# Patient Record
Sex: FEMALE | Race: WHITE | Employment: FULL TIME | URBAN - METROPOLITAN AREA
[De-identification: names, ages, dates, MRNs, and addresses within clinical notes are randomized per-mention and may not be internally consistent; named-entity substitution may affect disease eponyms.]

---

## 2018-08-27 ENCOUNTER — HOSPITAL ENCOUNTER (OUTPATIENT)
Dept: ULTRASOUND IMAGING | Age: 53
Discharge: HOME | End: 2018-08-27
Attending: NURSE PRACTITIONER
Payer: COMMERCIAL

## 2018-08-27 DIAGNOSIS — M79.89 LEG SWELLING: ICD-10-CM

## 2018-08-27 PROCEDURE — 93970 EXTREMITY STUDY: CPT

## 2018-10-26 PROBLEM — F32.0 CURRENT MILD EPISODE OF MAJOR DEPRESSIVE DISORDER WITHOUT PRIOR EPISODE (HCC): Status: ACTIVE | Noted: 2018-10-26

## 2019-05-25 ENCOUNTER — HOSPITAL ENCOUNTER (OUTPATIENT)
Dept: MAMMOGRAPHY | Age: 54
Discharge: HOME | End: 2019-05-25
Attending: NURSE PRACTITIONER
Payer: COMMERCIAL

## 2019-05-25 DIAGNOSIS — Z12.39 BREAST CANCER SCREENING: ICD-10-CM

## 2019-05-25 PROCEDURE — 77067 SCR MAMMO BI INCL CAD: CPT

## 2020-04-03 ENCOUNTER — HOSPITAL ENCOUNTER (OUTPATIENT)
Age: 55
Setting detail: OUTPATIENT SURGERY
Discharge: HOME | End: 2020-04-04
Attending: EMERGENCY MEDICINE | Admitting: SURGERY
Payer: COMMERCIAL

## 2020-04-03 ENCOUNTER — ANESTHESIA (OUTPATIENT)
Dept: SURGERY | Age: 55
End: 2020-04-03
Payer: COMMERCIAL

## 2020-04-03 ENCOUNTER — ANESTHESIA EVENT (OUTPATIENT)
Dept: SURGERY | Age: 55
End: 2020-04-03
Payer: COMMERCIAL

## 2020-04-03 ENCOUNTER — APPOINTMENT (OUTPATIENT)
Dept: CT IMAGING | Age: 55
End: 2020-04-03
Attending: EMERGENCY MEDICINE
Payer: COMMERCIAL

## 2020-04-03 DIAGNOSIS — K35.30 ACUTE APPENDICITIS WITH LOCALIZED PERITONITIS, WITHOUT PERFORATION OR ABSCESS, UNSPECIFIED WHETHER GANGRENE PRESENT: Primary | ICD-10-CM

## 2020-04-03 PROBLEM — R10.31 RLQ ABDOMINAL PAIN: Status: ACTIVE | Noted: 2020-04-03

## 2020-04-03 PROBLEM — K35.80 ACUTE APPENDICITIS: Status: ACTIVE | Noted: 2020-04-03

## 2020-04-03 PROBLEM — D72.829 LEUKOCYTOSIS: Status: ACTIVE | Noted: 2020-04-03

## 2020-04-03 LAB
ALBUMIN SERPL-MCNC: 3.7 G/DL (ref 3.5–5)
ALBUMIN/GLOB SERPL: 0.9 {RATIO} (ref 1.2–3.5)
ALP SERPL-CCNC: 94 U/L (ref 50–130)
ALT SERPL-CCNC: 13 U/L (ref 12–65)
ANION GAP SERPL CALC-SCNC: 4 MMOL/L (ref 7–16)
AST SERPL-CCNC: 14 U/L (ref 15–37)
BASOPHILS # BLD: 0 K/UL (ref 0–0.2)
BASOPHILS NFR BLD: 0 % (ref 0–2)
BILIRUB SERPL-MCNC: 0.5 MG/DL (ref 0.2–1.1)
BUN SERPL-MCNC: 8 MG/DL (ref 6–23)
CALCIUM SERPL-MCNC: 9.1 MG/DL (ref 8.3–10.4)
CHLORIDE SERPL-SCNC: 101 MMOL/L (ref 98–107)
CO2 SERPL-SCNC: 31 MMOL/L (ref 21–32)
CREAT SERPL-MCNC: 0.92 MG/DL (ref 0.6–1)
DIFFERENTIAL METHOD BLD: ABNORMAL
EOSINOPHIL # BLD: 0 K/UL (ref 0–0.8)
EOSINOPHIL NFR BLD: 0 % (ref 0.5–7.8)
ERYTHROCYTE [DISTWIDTH] IN BLOOD BY AUTOMATED COUNT: 11.9 % (ref 11.9–14.6)
GLOBULIN SER CALC-MCNC: 4.3 G/DL (ref 2.3–3.5)
GLUCOSE SERPL-MCNC: 98 MG/DL (ref 65–100)
HCT VFR BLD AUTO: 43 % (ref 35.8–46.3)
HGB BLD-MCNC: 14.1 G/DL (ref 11.7–15.4)
IMM GRANULOCYTES # BLD AUTO: 0.1 K/UL (ref 0–0.5)
IMM GRANULOCYTES NFR BLD AUTO: 0 % (ref 0–5)
LIPASE SERPL-CCNC: 302 U/L (ref 73–393)
LYMPHOCYTES # BLD: 1.2 K/UL (ref 0.5–4.6)
LYMPHOCYTES NFR BLD: 10 % (ref 13–44)
MCH RBC QN AUTO: 29.6 PG (ref 26.1–32.9)
MCHC RBC AUTO-ENTMCNC: 32.8 G/DL (ref 31.4–35)
MCV RBC AUTO: 90.1 FL (ref 79.6–97.8)
MONOCYTES # BLD: 0.6 K/UL (ref 0.1–1.3)
MONOCYTES NFR BLD: 5 % (ref 4–12)
NEUTS SEG # BLD: 10.1 K/UL (ref 1.7–8.2)
NEUTS SEG NFR BLD: 84 % (ref 43–78)
NRBC # BLD: 0 K/UL (ref 0–0.2)
PLATELET # BLD AUTO: 275 K/UL (ref 150–450)
PMV BLD AUTO: 10.9 FL (ref 9.4–12.3)
POTASSIUM SERPL-SCNC: 3.8 MMOL/L (ref 3.5–5.1)
PROT SERPL-MCNC: 8 G/DL (ref 6.3–8.2)
RBC # BLD AUTO: 4.77 M/UL (ref 4.05–5.2)
SODIUM SERPL-SCNC: 136 MMOL/L (ref 136–145)
WBC # BLD AUTO: 12.1 K/UL (ref 4.3–11.1)

## 2020-04-03 PROCEDURE — 77030008606 HC TRCR ENDOSC KII AMR -B: Performed by: SURGERY

## 2020-04-03 PROCEDURE — 96376 TX/PRO/DX INJ SAME DRUG ADON: CPT

## 2020-04-03 PROCEDURE — 77030022703 HC LIGASURE  BLNT LAPSCP SEAL COVD -E: Performed by: SURGERY

## 2020-04-03 PROCEDURE — 96375 TX/PRO/DX INJ NEW DRUG ADDON: CPT

## 2020-04-03 PROCEDURE — 77030040922 HC BLNKT HYPOTHRM STRY -A: Performed by: ANESTHESIOLOGY

## 2020-04-03 PROCEDURE — 96374 THER/PROPH/DIAG INJ IV PUSH: CPT

## 2020-04-03 PROCEDURE — 99218 HC RM OBSERVATION: CPT

## 2020-04-03 PROCEDURE — 77030021158 HC TRCR BLN GELPRT AMR -B: Performed by: SURGERY

## 2020-04-03 PROCEDURE — 74011000258 HC RX REV CODE- 258: Performed by: SURGERY

## 2020-04-03 PROCEDURE — 74011000250 HC RX REV CODE- 250: Performed by: NURSE ANESTHETIST, CERTIFIED REGISTERED

## 2020-04-03 PROCEDURE — 74011000250 HC RX REV CODE- 250: Performed by: SURGERY

## 2020-04-03 PROCEDURE — 88304 TISSUE EXAM BY PATHOLOGIST: CPT

## 2020-04-03 PROCEDURE — 77030040361 HC SLV COMPR DVT MDII -B: Performed by: SURGERY

## 2020-04-03 PROCEDURE — 77030007955 HC PCH ENDOSC SPEC J&J -B: Performed by: SURGERY

## 2020-04-03 PROCEDURE — 77030008608 HC TRCR ENDOSC SMTH AMR -B: Performed by: SURGERY

## 2020-04-03 PROCEDURE — 77030008522 HC TBNG INSUF LAPRO STRY -B: Performed by: SURGERY

## 2020-04-03 PROCEDURE — 77030031139 HC SUT VCRL2 J&J -A: Performed by: SURGERY

## 2020-04-03 PROCEDURE — 77030020829: Performed by: SURGERY

## 2020-04-03 PROCEDURE — 77030008462 HC STPLR SKN PROX J&J -A: Performed by: SURGERY

## 2020-04-03 PROCEDURE — 76010000161 HC OR TIME 1 TO 1.5 HR INTENSV-TIER 1: Performed by: SURGERY

## 2020-04-03 PROCEDURE — 77030039425 HC BLD LARYNG TRULITE DISP TELE -A: Performed by: NURSE ANESTHETIST, CERTIFIED REGISTERED

## 2020-04-03 PROCEDURE — 77030009967 HC RELD STPLR ENDOSC J&J -C: Performed by: SURGERY

## 2020-04-03 PROCEDURE — 76210000006 HC OR PH I REC 0.5 TO 1 HR: Performed by: SURGERY

## 2020-04-03 PROCEDURE — 81003 URINALYSIS AUTO W/O SCOPE: CPT

## 2020-04-03 PROCEDURE — 77030037088 HC TUBE ENDOTRACH ORAL NSL COVD-A: Performed by: NURSE ANESTHETIST, CERTIFIED REGISTERED

## 2020-04-03 PROCEDURE — 74011636320 HC RX REV CODE- 636/320: Performed by: EMERGENCY MEDICINE

## 2020-04-03 PROCEDURE — 74177 CT ABD & PELVIS W/CONTRAST: CPT

## 2020-04-03 PROCEDURE — 77030040830 HC CATH URETH FOL MDII -A: Performed by: SURGERY

## 2020-04-03 PROCEDURE — 83690 ASSAY OF LIPASE: CPT

## 2020-04-03 PROCEDURE — 74011000258 HC RX REV CODE- 258: Performed by: EMERGENCY MEDICINE

## 2020-04-03 PROCEDURE — 77030011810 HC STPLR ENDOSC J&J -G: Performed by: SURGERY

## 2020-04-03 PROCEDURE — 77030019908 HC STETH ESOPH SIMS -A: Performed by: ANESTHESIOLOGY

## 2020-04-03 PROCEDURE — 80053 COMPREHEN METABOLIC PANEL: CPT

## 2020-04-03 PROCEDURE — 77030012770 HC TRCR OPT FX AMR -B: Performed by: SURGERY

## 2020-04-03 PROCEDURE — 77030008756 HC TU IRR SUC STRY -B: Performed by: SURGERY

## 2020-04-03 PROCEDURE — 99284 EMERGENCY DEPT VISIT MOD MDM: CPT

## 2020-04-03 PROCEDURE — 74011250636 HC RX REV CODE- 250/636: Performed by: EMERGENCY MEDICINE

## 2020-04-03 PROCEDURE — 74011250636 HC RX REV CODE- 250/636: Performed by: NURSE ANESTHETIST, CERTIFIED REGISTERED

## 2020-04-03 PROCEDURE — 85025 COMPLETE CBC W/AUTO DIFF WBC: CPT

## 2020-04-03 PROCEDURE — 74011250636 HC RX REV CODE- 250/636: Performed by: SURGERY

## 2020-04-03 PROCEDURE — 77030018836 HC SOL IRR NACL ICUM -A: Performed by: SURGERY

## 2020-04-03 PROCEDURE — 76060000033 HC ANESTHESIA 1 TO 1.5 HR: Performed by: SURGERY

## 2020-04-03 PROCEDURE — 77030034849: Performed by: SURGERY

## 2020-04-03 RX ORDER — GLYCOPYRROLATE 0.2 MG/ML
INJECTION INTRAMUSCULAR; INTRAVENOUS AS NEEDED
Status: DISCONTINUED | OUTPATIENT
Start: 2020-04-03 | End: 2020-04-04 | Stop reason: HOSPADM

## 2020-04-03 RX ORDER — ONDANSETRON 2 MG/ML
4 INJECTION INTRAMUSCULAR; INTRAVENOUS
Status: COMPLETED | OUTPATIENT
Start: 2020-04-03 | End: 2020-04-03

## 2020-04-03 RX ORDER — PROPOFOL 10 MG/ML
INJECTION, EMULSION INTRAVENOUS AS NEEDED
Status: DISCONTINUED | OUTPATIENT
Start: 2020-04-03 | End: 2020-04-04 | Stop reason: HOSPADM

## 2020-04-03 RX ORDER — DEXAMETHASONE SODIUM PHOSPHATE 4 MG/ML
INJECTION, SOLUTION INTRA-ARTICULAR; INTRALESIONAL; INTRAMUSCULAR; INTRAVENOUS; SOFT TISSUE AS NEEDED
Status: DISCONTINUED | OUTPATIENT
Start: 2020-04-03 | End: 2020-04-04 | Stop reason: HOSPADM

## 2020-04-03 RX ORDER — ROCURONIUM BROMIDE 10 MG/ML
INJECTION, SOLUTION INTRAVENOUS AS NEEDED
Status: DISCONTINUED | OUTPATIENT
Start: 2020-04-03 | End: 2020-04-04 | Stop reason: HOSPADM

## 2020-04-03 RX ORDER — LIDOCAINE HYDROCHLORIDE 20 MG/ML
INJECTION, SOLUTION EPIDURAL; INFILTRATION; INTRACAUDAL; PERINEURAL AS NEEDED
Status: DISCONTINUED | OUTPATIENT
Start: 2020-04-03 | End: 2020-04-04 | Stop reason: HOSPADM

## 2020-04-03 RX ORDER — SUCCINYLCHOLINE CHLORIDE 20 MG/ML
INJECTION INTRAMUSCULAR; INTRAVENOUS AS NEEDED
Status: DISCONTINUED | OUTPATIENT
Start: 2020-04-03 | End: 2020-04-04 | Stop reason: HOSPADM

## 2020-04-03 RX ORDER — BUPIVACAINE HYDROCHLORIDE 2.5 MG/ML
INJECTION, SOLUTION EPIDURAL; INFILTRATION; INTRACAUDAL AS NEEDED
Status: DISCONTINUED | OUTPATIENT
Start: 2020-04-03 | End: 2020-04-03 | Stop reason: HOSPADM

## 2020-04-03 RX ORDER — MORPHINE SULFATE 10 MG/ML
5 INJECTION, SOLUTION INTRAMUSCULAR; INTRAVENOUS
Status: COMPLETED | OUTPATIENT
Start: 2020-04-03 | End: 2020-04-03

## 2020-04-03 RX ORDER — SODIUM CHLORIDE 0.9 % (FLUSH) 0.9 %
10 SYRINGE (ML) INJECTION
Status: COMPLETED | OUTPATIENT
Start: 2020-04-03 | End: 2020-04-03

## 2020-04-03 RX ORDER — SODIUM CHLORIDE, SODIUM LACTATE, POTASSIUM CHLORIDE, CALCIUM CHLORIDE 600; 310; 30; 20 MG/100ML; MG/100ML; MG/100ML; MG/100ML
150 INJECTION, SOLUTION INTRAVENOUS CONTINUOUS
Status: DISCONTINUED | OUTPATIENT
Start: 2020-04-03 | End: 2020-04-04 | Stop reason: HOSPADM

## 2020-04-03 RX ORDER — FENTANYL CITRATE 50 UG/ML
INJECTION, SOLUTION INTRAMUSCULAR; INTRAVENOUS AS NEEDED
Status: DISCONTINUED | OUTPATIENT
Start: 2020-04-03 | End: 2020-04-04 | Stop reason: HOSPADM

## 2020-04-03 RX ORDER — NEOSTIGMINE METHYLSULFATE 1 MG/ML
INJECTION, SOLUTION INTRAVENOUS AS NEEDED
Status: DISCONTINUED | OUTPATIENT
Start: 2020-04-03 | End: 2020-04-04 | Stop reason: HOSPADM

## 2020-04-03 RX ORDER — SODIUM CHLORIDE, SODIUM LACTATE, POTASSIUM CHLORIDE, CALCIUM CHLORIDE 600; 310; 30; 20 MG/100ML; MG/100ML; MG/100ML; MG/100ML
INJECTION, SOLUTION INTRAVENOUS
Status: DISCONTINUED | OUTPATIENT
Start: 2020-04-03 | End: 2020-04-04 | Stop reason: HOSPADM

## 2020-04-03 RX ADMIN — ONDANSETRON 4 MG: 2 INJECTION INTRAMUSCULAR; INTRAVENOUS at 18:50

## 2020-04-03 RX ADMIN — FAMOTIDINE 20 MG: 10 INJECTION, SOLUTION INTRAVENOUS at 22:44

## 2020-04-03 RX ADMIN — IOPAMIDOL 100 ML: 755 INJECTION, SOLUTION INTRAVENOUS at 19:47

## 2020-04-03 RX ADMIN — ROCURONIUM BROMIDE 20 MG: 10 INJECTION, SOLUTION INTRAVENOUS at 22:59

## 2020-04-03 RX ADMIN — LIDOCAINE HYDROCHLORIDE 60 MG: 20 INJECTION, SOLUTION EPIDURAL; INFILTRATION; INTRACAUDAL; PERINEURAL at 22:50

## 2020-04-03 RX ADMIN — FENTANYL CITRATE 50 MCG: 50 INJECTION INTRAMUSCULAR; INTRAVENOUS at 23:21

## 2020-04-03 RX ADMIN — PHENYLEPHRINE HYDROCHLORIDE 100 MCG: 10 INJECTION INTRAVENOUS at 23:06

## 2020-04-03 RX ADMIN — SODIUM CHLORIDE, SODIUM LACTATE, POTASSIUM CHLORIDE, AND CALCIUM CHLORIDE 150 ML/HR: 600; 310; 30; 20 INJECTION, SOLUTION INTRAVENOUS at 18:57

## 2020-04-03 RX ADMIN — SODIUM CHLORIDE 4.5 G: 900 INJECTION, SOLUTION INTRAVENOUS at 22:44

## 2020-04-03 RX ADMIN — SUCCINYLCHOLINE CHLORIDE 140 MG: 20 INJECTION, SOLUTION INTRAMUSCULAR; INTRAVENOUS at 22:50

## 2020-04-03 RX ADMIN — GLYCOPYRROLATE 0.4 MG: 0.2 INJECTION, SOLUTION INTRAMUSCULAR; INTRAVENOUS at 23:48

## 2020-04-03 RX ADMIN — Medication 10 ML: at 19:47

## 2020-04-03 RX ADMIN — MORPHINE SULFATE 5 MG: 10 INJECTION INTRAVENOUS at 18:50

## 2020-04-03 RX ADMIN — SODIUM CHLORIDE 100 ML: 900 INJECTION, SOLUTION INTRAVENOUS at 19:47

## 2020-04-03 RX ADMIN — MORPHINE SULFATE 5 MG: 10 INJECTION INTRAVENOUS at 22:01

## 2020-04-03 RX ADMIN — Medication 3 MG: at 23:48

## 2020-04-03 RX ADMIN — SODIUM CHLORIDE, SODIUM LACTATE, POTASSIUM CHLORIDE, AND CALCIUM CHLORIDE: 600; 310; 30; 20 INJECTION, SOLUTION INTRAVENOUS at 22:44

## 2020-04-03 RX ADMIN — ROCURONIUM BROMIDE 10 MG: 10 INJECTION, SOLUTION INTRAVENOUS at 23:21

## 2020-04-03 RX ADMIN — ROCURONIUM BROMIDE 5 MG: 10 INJECTION, SOLUTION INTRAVENOUS at 22:50

## 2020-04-03 RX ADMIN — FENTANYL CITRATE 50 MCG: 50 INJECTION INTRAMUSCULAR; INTRAVENOUS at 22:50

## 2020-04-03 RX ADMIN — DEXAMETHASONE SODIUM PHOSPHATE 4 MG: 4 INJECTION, SOLUTION INTRAMUSCULAR; INTRAVENOUS at 23:10

## 2020-04-03 RX ADMIN — PROPOFOL 200 MG: 10 INJECTION, EMULSION INTRAVENOUS at 22:50

## 2020-04-03 RX ADMIN — ONDANSETRON 4 MG: 2 INJECTION INTRAMUSCULAR; INTRAVENOUS at 22:01

## 2020-04-03 NOTE — ED TRIAGE NOTES
Patient co right lower abdominal pain and nausea since yesterday.   Patient denies any vaginal discharge

## 2020-04-03 NOTE — ED PROVIDER NOTES
The history is provided by the patient. Abdominal Pain    This is a new problem. The current episode started yesterday. The problem occurs constantly. The problem has been gradually worsening. Associated with: nausea. The pain is located in the RLQ. The quality of the pain is aching and sharp. The pain is moderate. Associated symptoms include nausea. Pertinent negatives include no fever, no diarrhea, no hematochezia, no melena, no vomiting, no dysuria, no frequency, no hematuria and no back pain. The pain is worsened by certain positions. The pain is relieved by nothing. The patient's surgical history includes cholecystectomy and hysterectomy. Past Medical History:   Diagnosis Date    Anxiety, generalized     Menopausal disorder     Menopause        Past Surgical History:   Procedure Laterality Date    HX CHOLECYSTECTOMY      HX GYN      hysterectomy    HX HYSTERECTOMY      IMPLANT BREAST SILICONE/EQ           Family History:   Problem Relation Age of Onset    Breast Cancer Paternal Aunt     Cancer Maternal Grandmother     Breast Cancer Paternal Grandmother     Diabetes Neg Hx     Heart Disease Neg Hx        Social History     Socioeconomic History    Marital status:      Spouse name: Not on file    Number of children: Not on file    Years of education: Not on file    Highest education level: Not on file   Occupational History    Not on file   Social Needs    Financial resource strain: Not on file    Food insecurity     Worry: Not on file     Inability: Not on file    Transportation needs     Medical: Not on file     Non-medical: Not on file   Tobacco Use    Smoking status: Former Smoker     Packs/day: 1.00     Years: 15.00     Pack years: 15.00     Last attempt to quit: 2000     Years since quittin.2    Smokeless tobacco: Never Used    Tobacco comment: about a pack a day for 13-15 years   Substance and Sexual Activity    Alcohol use:  Yes     Alcohol/week: 0.0 standard drinks     Comment: 6 per year    Drug use: No    Sexual activity: Yes     Partners: Female     Comment: hysterectomy   Lifestyle    Physical activity     Days per week: Not on file     Minutes per session: Not on file    Stress: Not on file   Relationships    Social connections     Talks on phone: Not on file     Gets together: Not on file     Attends [employer] service: Not on file     Active member of club or organization: Not on file     Attends meetings of clubs or organizations: Not on file     Relationship status: Not on file    Intimate partner violence     Fear of current or ex partner: Not on file     Emotionally abused: Not on file     Physically abused: Not on file     Forced sexual activity: Not on file   Other Topics Concern    Not on file   Social History Narrative    Not on file         ALLERGIES: Patient has no known allergies. Review of Systems   Constitutional: Negative for fever. HENT: Negative for congestion and rhinorrhea. Respiratory: Negative for cough and shortness of breath. Gastrointestinal: Positive for abdominal pain and nausea. Negative for diarrhea, hematochezia, melena and vomiting. Endocrine: Negative for polyuria. Genitourinary: Negative for dysuria, frequency and hematuria. Musculoskeletal: Negative for back pain. Vitals:    04/03/20 1814   BP: 125/88   Pulse: 73   Resp: 16   Temp: 98.3 °F (36.8 °C)   SpO2: 96%   Weight: 77.1 kg (170 lb)   Height: 5' 6\" (1.676 m)            Physical Exam  Vitals signs and nursing note reviewed. Constitutional:       General: She is not in acute distress. Appearance: She is well-developed. HENT:      Head: Normocephalic. Mouth/Throat:      Mouth: Mucous membranes are moist.   Eyes:      Pupils: Pupils are equal, round, and reactive to light. Cardiovascular:      Rate and Rhythm: Normal rate and regular rhythm. Heart sounds: Normal heart sounds.    Pulmonary:      Effort: Pulmonary effort is normal. Breath sounds: Normal breath sounds. Abdominal:      General: There is no distension. Palpations: Abdomen is soft. There is no mass. Tenderness: There is abdominal tenderness in the right lower quadrant, suprapubic area and left lower quadrant. There is no guarding or rebound. Positive signs include Rovsing's sign and McBurney's sign. Musculoskeletal: Normal range of motion. Lymphadenopathy:      Cervical: No cervical adenopathy. Skin:     General: Skin is warm and dry. Neurological:      Mental Status: She is alert. MDM  Number of Diagnoses or Management Options  Diagnosis management comments: Gradual worsening of right lower quadrant pain since yesterday. Patient is status post hysterectomy and cholecystectomy. Probable appendicitis. Rebound tenderness present. Positive Rovsing sign. No fevers. 8:10 PM  Surgery paged for definitive management of acute appendicitis.        Amount and/or Complexity of Data Reviewed  Clinical lab tests: ordered and reviewed (Results for orders placed or performed during the hospital encounter of 04/03/20  -CBC WITH AUTOMATED DIFF       Result                      Value             Ref Range           WBC                         12.1 (H)          4.3 - 11.1 K*       RBC                         4.77              4.05 - 5.2 M*       HGB                         14.1              11.7 - 15.4 *       HCT                         43.0              35.8 - 46.3 %       MCV                         90.1              79.6 - 97.8 *       MCH                         29.6              26.1 - 32.9 *       MCHC                        32.8              31.4 - 35.0 *       RDW                         11.9              11.9 - 14.6 %       PLATELET                    275               150 - 450 K/*       MPV                         10.9              9.4 - 12.3 FL       ABSOLUTE NRBC               0.00              0.0 - 0.2 K/*       DF AUTOMATED                             NEUTROPHILS                 84 (H)            43 - 78 %           LYMPHOCYTES                 10 (L)            13 - 44 %           MONOCYTES                   5                 4.0 - 12.0 %        EOSINOPHILS                 0 (L)             0.5 - 7.8 %         BASOPHILS                   0                 0.0 - 2.0 %         IMMATURE GRANULOCYTES       0                 0.0 - 5.0 %         ABS. NEUTROPHILS            10.1 (H)          1.7 - 8.2 K/*       ABS. LYMPHOCYTES            1.2               0.5 - 4.6 K/*       ABS. MONOCYTES              0.6               0.1 - 1.3 K/*       ABS. EOSINOPHILS            0.0               0.0 - 0.8 K/*       ABS. BASOPHILS              0.0               0.0 - 0.2 K/*       ABS. IMM. GRANS.            0.1               0.0 - 0.5 K/*  -METABOLIC PANEL, COMPREHENSIVE       Result                      Value             Ref Range           Sodium                      136               136 - 145 mm*       Potassium                   3.8               3.5 - 5.1 mm*       Chloride                    101               98 - 107 mmo*       CO2                         31                21 - 32 mmol*       Anion gap                   4 (L)             7 - 16 mmol/L       Glucose                     98                65 - 100 mg/*       BUN                         8                 6 - 23 MG/DL        Creatinine                  0.92              0.6 - 1.0 MG*       GFR est AA                  >60               >60 ml/min/1*       GFR est non-AA              >60               >60 ml/min/1*       Calcium                     9.1               8.3 - 10.4 M*       Bilirubin, total            0.5               0.2 - 1.1 MG*       ALT (SGPT)                  13                12 - 65 U/L         AST (SGOT)                  14 (L)            15 - 37 U/L         Alk.  phosphatase            94                50 - 130 U/L        Protein, total 8.0               6.3 - 8.2 g/*       Albumin                     3.7               3.5 - 5.0 g/*       Globulin                    4.3 (H)           2.3 - 3.5 g/*       A-G Ratio                   0.9 (L)           1.2 - 3.5      -LIPASE       Result                      Value             Ref Range           Lipase                      302               73 - 393 U/L   )  Tests in the radiology section of CPT®: ordered and reviewed (Ct Abd Pelv W Cont    Result Date: 4/3/2020  CT OF THE ABDOMEN AND PELVIS WITH CONTRAST. CLINICAL INDICATION: Right lower quadrant abdominal pain, concern for appendicitis PROCEDURE: Serial thin section axial images obtained from the lung bases through the proximal femurs following the administration of 100 cc of Isovue 370 intravenous contrast.  Radiation dose reduction techniques were used for this study. Our CT scanners use one or all of the following: Automated exposure control, adjusted of the mA and/or kV according to patient size, iterative reconstruction COMPARISON: No prior FINDINGS: CT ABDOMEN: Cholecystectomy clips are present. The liver and spleen are unremarkable. The adrenal glands are normal. The pancreas is unremarkable. The kidneys are symmetric in size and contrast enhanced. There is no hydronephrosis. The bowel is normal in course, caliber, and wall thickness. CT PELVIS: A fluid-filled, dilated appendix is appreciated. This lies in the right hemipelvis it measures 9 mm transverse with surrounding periappendiceal fat inflammation. A trace amount of reactive free fluid is noted dependently in the pelvis. No para appendiceal abscess evident. There is no inguinal hernia or adenopathy. The bladder is unremarkable. No aggressive osseous lesions identified. IMPRESSION: 1. Acute appendicitis with significant periappendiceal fat inflammation. No obvious periappendiceal abscess.  [facility] The critical results contained in this report were communicated to Dr. Sharon Butler by Dr. Katie Kaur on 4/3/2020 at 8:00 PM. Critical results were communicated as outlined in Section II.C.2.a.i of the ACR Practice Guideline for Communication of Diagnostic Imaging Findings.     )           Procedures

## 2020-04-04 VITALS
SYSTOLIC BLOOD PRESSURE: 116 MMHG | RESPIRATION RATE: 20 BRPM | DIASTOLIC BLOOD PRESSURE: 72 MMHG | WEIGHT: 170 LBS | TEMPERATURE: 98.5 F | HEIGHT: 66 IN | BODY MASS INDEX: 27.32 KG/M2 | HEART RATE: 69 BPM | OXYGEN SATURATION: 95 %

## 2020-04-04 PROCEDURE — 74011250637 HC RX REV CODE- 250/637: Performed by: ANESTHESIOLOGY

## 2020-04-04 PROCEDURE — 74011250636 HC RX REV CODE- 250/636: Performed by: ANESTHESIOLOGY

## 2020-04-04 PROCEDURE — 99218 HC RM OBSERVATION: CPT

## 2020-04-04 RX ORDER — SODIUM CHLORIDE 0.9 % (FLUSH) 0.9 %
5-40 SYRINGE (ML) INJECTION AS NEEDED
Status: DISCONTINUED | OUTPATIENT
Start: 2020-04-04 | End: 2020-04-04 | Stop reason: HOSPADM

## 2020-04-04 RX ORDER — OXYCODONE HYDROCHLORIDE 5 MG/1
5 TABLET ORAL
Status: DISCONTINUED | OUTPATIENT
Start: 2020-04-04 | End: 2020-04-04 | Stop reason: HOSPADM

## 2020-04-04 RX ORDER — HYDROMORPHONE HYDROCHLORIDE 2 MG/ML
0.5 INJECTION, SOLUTION INTRAMUSCULAR; INTRAVENOUS; SUBCUTANEOUS
Status: DISCONTINUED | OUTPATIENT
Start: 2020-04-04 | End: 2020-04-04 | Stop reason: HOSPADM

## 2020-04-04 RX ORDER — NALOXONE HYDROCHLORIDE 0.4 MG/ML
0.2 INJECTION, SOLUTION INTRAMUSCULAR; INTRAVENOUS; SUBCUTANEOUS AS NEEDED
Status: DISCONTINUED | OUTPATIENT
Start: 2020-04-04 | End: 2020-04-04 | Stop reason: HOSPADM

## 2020-04-04 RX ORDER — SODIUM CHLORIDE 0.9 % (FLUSH) 0.9 %
5-40 SYRINGE (ML) INJECTION EVERY 8 HOURS
Status: DISCONTINUED | OUTPATIENT
Start: 2020-04-04 | End: 2020-04-04 | Stop reason: HOSPADM

## 2020-04-04 RX ORDER — ACETAMINOPHEN 500 MG
1000 TABLET ORAL ONCE
Status: DISCONTINUED | OUTPATIENT
Start: 2020-04-04 | End: 2020-04-04 | Stop reason: HOSPADM

## 2020-04-04 RX ORDER — FLUMAZENIL 0.1 MG/ML
0.2 INJECTION INTRAVENOUS
Status: DISCONTINUED | OUTPATIENT
Start: 2020-04-04 | End: 2020-04-04 | Stop reason: HOSPADM

## 2020-04-04 RX ORDER — DIPHENHYDRAMINE HYDROCHLORIDE 50 MG/ML
12.5 INJECTION, SOLUTION INTRAMUSCULAR; INTRAVENOUS
Status: DISCONTINUED | OUTPATIENT
Start: 2020-04-04 | End: 2020-04-04 | Stop reason: HOSPADM

## 2020-04-04 RX ORDER — SODIUM CHLORIDE, SODIUM LACTATE, POTASSIUM CHLORIDE, CALCIUM CHLORIDE 600; 310; 30; 20 MG/100ML; MG/100ML; MG/100ML; MG/100ML
100 INJECTION, SOLUTION INTRAVENOUS CONTINUOUS
Status: DISCONTINUED | OUTPATIENT
Start: 2020-04-04 | End: 2020-04-04 | Stop reason: HOSPADM

## 2020-04-04 RX ORDER — OXYCODONE HYDROCHLORIDE 5 MG/1
10 TABLET ORAL
Status: COMPLETED | OUTPATIENT
Start: 2020-04-04 | End: 2020-04-04

## 2020-04-04 RX ADMIN — OXYCODONE HYDROCHLORIDE 10 MG: 5 TABLET ORAL at 00:28

## 2020-04-04 RX ADMIN — HYDROMORPHONE HYDROCHLORIDE 0.5 MG: 2 INJECTION INTRAMUSCULAR; INTRAVENOUS; SUBCUTANEOUS at 00:07

## 2020-04-04 RX ADMIN — HYDROMORPHONE HYDROCHLORIDE 0.5 MG: 2 INJECTION INTRAMUSCULAR; INTRAVENOUS; SUBCUTANEOUS at 00:12

## 2020-04-04 NOTE — ANESTHESIA PREPROCEDURE EVALUATION
Relevant Problems   No relevant active problems       Anesthetic History   No history of anesthetic complications            Review of Systems / Medical History  Patient summary reviewed and pertinent labs reviewed    Pulmonary  Within defined limits                 Neuro/Psych         Psychiatric history     Cardiovascular                  Exercise tolerance: >4 METS     GI/Hepatic/Renal  Within defined limits              Endo/Other             Other Findings              Physical Exam    Airway  Mallampati: II  TM Distance: 4 - 6 cm  Neck ROM: normal range of motion   Mouth opening: Normal     Cardiovascular    Rhythm: regular  Rate: normal         Dental         Pulmonary  Breath sounds clear to auscultation               Abdominal         Other Findings            Anesthetic Plan    ASA: 2, emergent  Anesthesia type: general          Induction: Intravenous and RSI  Anesthetic plan and risks discussed with: Patient

## 2020-04-04 NOTE — OP NOTES
[address], [address] [facility]  [phone]    OPERATVE REPORT    Name: Orene Alpers     Date of Surgery: 4/3/2020  Med Record Number: [id number]   Age: 50 y.o. Sex: female   Pre-operative Diagnosis: ACUTE APPENDICITIS  Post-operative Diagnosis: same  Procedure: Laparoscopic Appendectomy (CPT 39072)  Surgeon: Darin Eisenmenger, MD  Assistants: none  Anesthesia:  General  Complications: none  Specimens:  appendix to path  Estimated Blood Loss:      OPERATIVE PROCEDURE: The risks, benefits, potential complications,  treatment options and expected outcomes were discussed with the patient  and/or patient family members preoperatively. The possibilities of  reactions to medications, chronic pain, bleeding, infection, abscess and  injury to internal organs including bowel, fistula, blood clots, hernia,  the need for further surgeries or procedures, open surgery, etc...were discussed and all the patient's and/or patient's family members  questions were answered. Understanding was voiced and informed consent  was obtained preoperatively. The patient was taken to the operating room  and [address] [place] [address] time-out protocol check list was followed. After induction of general anesthesia, the abdomen was prepped and draped  in the usual fashion. The patient was opened through a subumbilical cut-down incision  and a Mercedes trocar was inserted under direct vision. After adequate  pneumoperitoneum, 5 mm and 12 mm trocars were placed in the usual  locations to help triangulate over the appendix. The appendix acutely inflamed but not ruptures. It was densely adherent and eroding into the terminal ileum and creating damage to the ileal wall which was inspected frequently during the case and looked reversible injured. The appendix was mobilized. The mesoappendix was divided with a LigaSure device. This was carried  down to the base of the appendix.  The appendix was divided from its insertion in the cecum with a laparoscopic CHRISTINE blue stapler and the appendix was then  placed within an Endo Catch bag and retracted out through the umbilical trocar  site with the camera having been reinserted through the 12 mm trocar  site, which had been placed to the right of midline. After a large amount of  irrigation of the intraabdominal cavity and confirmation of adequate  hemostasis and inspection of the cecal stump to confirm that the clips  were intact with good closure, Marcaine was infiltrated into each  trocar site. The trocars were withdrawn without active bleeding. The  fascia at the umbilical level was closed with interrupted 0 Vicryl sutures. The  fascia at the 12 mm trocar site was closed with Vicryl suture. The skin  was closed with clips. Sterile dressings were placed. The patient was  taken to recovery in good condition.     Susan Miller MD, FACS

## 2020-04-04 NOTE — DISCHARGE INSTRUCTIONS
OK to leave your incisional dressings alone until follow-up visit. If a dressing gets excessively saturated or falls off, it is OK to change it daily with gauze and tape (or band-aids) until follow-up visit. Keep them covered daily until follow-up. Try to keep incisions as dry as possible to lower risk of infection. No heavy lifting (>5lbs) for 6 weeks to reduce risk of developing a hernia in the incisions. No driving until you are off pain meds for 24hrs and have no pain with movements associated with driving. Pain prescription (Norco) on chart for patient to use as needed for pain  Follow-up with Dr Jenaro Bojorquez nurse practitioner Heidi Sanchez NP or Martine Leiva NP) in approx 10 days on a Monday. Then see Dr Irina Seymour as needed after that visit.   [address], [address]  (Call for an appt time unless one is already made for you by discharge nurse which is preferred -->[phone]-->option 1)

## 2020-04-04 NOTE — H&P
H&P/Consult Note/Progress Note/Office Note:   Joaquin Cadena  MRN: [id number]  :1965  Age:55 y.o.    HPI: Joaquin Cadena is a 50 y.o. female who came to the ER on 4/3/20 with progressive, constant, severe, non-radiating RLQ for 4 days. Nothing made pain better or worse. No associated fever. She is s/p lap kayleen, , and lap hysterectomy (for benign disease) in the  at an unknown hospital by unknown surgeons  In ER she had leukocytosis and CT as below with findings suggestive of acute appendicitis. Gen Surgery was consulted emergently. 4/3/20 CT abd/pelvis with IV contrast     CT ABDOMEN: Cholecystectomy clips are present. The liver and spleen are  unremarkable. The adrenal glands are normal. The pancreas is unremarkable. The  kidneys are symmetric in size and contrast enhanced. There is no hydronephrosis. The bowel is normal in course, caliber, and wall thickness.     CT PELVIS: A fluid-filled, dilated appendix is appreciated. This lies in the  right hemipelvis it measures 9 mm transverse with surrounding periappendiceal  fat inflammation. A trace amount of reactive free fluid is noted dependently in  the pelvis. No para appendiceal abscess evident. There is no inguinal hernia or  adenopathy. The bladder is unremarkable.     No aggressive osseous lesions identified.     IMPRESSION:  1. Acute appendicitis with significant periappendiceal fat inflammation.    No obvious periappendiceal abscess.           Past Medical History:   Diagnosis Date    Anxiety, generalized     Menopausal disorder     Menopause      Past Surgical History:   Procedure Laterality Date    HX CHOLECYSTECTOMY      HX GYN      hysterectomy    HX HYSTERECTOMY      IMPLANT BREAST SILICONE/EQ       Current Facility-Administered Medications   Medication Dose Route Frequency    lactated Ringers infusion  150 mL/hr IntraVENous CONTINUOUS    piperacillin-tazobactam (ZOSYN) 4.5 g in 0.9% sodium chloride (MBP/ADV) 100 mL  4.5 g IntraVENous Q6H    famotidine (PF) (PEPCID) 20 mg in 0.9% sodium chloride 10 mL injection  20 mg IntraVENous Q12H     Current Outpatient Medications   Medication Sig    estradioL (Vivelle-Dot) 0.025 mg/24 hr ptsw APPLY 1 PATCH EXTERNALLY TO THE SKIN 2 TIMES A WEEK    venlafaxine-SR (EFFEXOR-XR) 75 mg capsule Take 1 Cap by mouth daily.  ibuprofen (ADVIL) 200 mg tablet Take 800 mg by mouth two (2) times a day. Patient has no known allergies. Social History     Socioeconomic History    Marital status:      Spouse name: Not on file    Number of children: Not on file    Years of education: Not on file    Highest education level: Not on file   Tobacco Use    Smoking status: Former Smoker     Packs/day: 1.00     Years: 15.00     Pack years: 15.00     Last attempt to quit: 2000     Years since quittin.2    Smokeless tobacco: Never Used    Tobacco comment: about a pack a day for 13-15 years   Substance and Sexual Activity    Alcohol use: Yes     Alcohol/week: 0.0 standard drinks     Comment: 6 per year    Drug use: No    Sexual activity: Yes     Partners: Female     Comment: hysterectomy     Social History     Tobacco Use   Smoking Status Former Smoker    Packs/day: 1.00    Years: 15.00    Pack years: 15.00    Last attempt to quit: 2000    Years since quittin.2   Smokeless Tobacco Never Used   Tobacco Comment    about a pack a day for 13-15 years     Family History   Problem Relation Age of Onset    Breast Cancer Paternal Aunt     Cancer Maternal Grandmother     Breast Cancer Paternal Grandmother     Diabetes Neg Hx     Heart Disease Neg Hx      ROS: The patient has no difficulty with chest pain or shortness of breath. No fever or chills. Comprehensive review of systems was otherwise unremarkable except as noted above.     Physical Exam:   Visit Vitals  /76   Pulse 73   Temp 98.3 °F (36.8 °C)   Resp 16   Ht 5' 6\" (1.676 m)   Wt 170 lb (77.1 kg) SpO2 97%   BMI 27.44 kg/m²     Vitals:    04/03/20 2056 04/03/20 2128 04/03/20 2152 04/03/20 2200   BP:  131/66 125/79 121/76   Pulse:       Resp:       Temp:       SpO2: 97% 99% 97% 97%   Weight:       Height:         No intake/output data recorded. No intake/output data recorded. Constitutional: Alert, oriented, cooperative patient in no acute distress; appears stated age    Eyes:Sclera are clear. EOMs intact  ENMT: no external lesions gross hearing normal; no obvious neck masses, no ear or lip lesions, nares normal  CV: RRR. Normal perfusion  Resp: No JVD. Breathing is  non-labored; no audible wheezing. GI: soft and non-distended     Healed laparoscopic incisions  Healed low Tx incision  Guarding RLQ       Musculoskeletal: unremarkable with normal function. No embolic signs or cyanosis.    Neuro:  Oriented; moves all 4; no focal deficits  Psychiatric: normal affect and mood, no memory impairment    Recent vitals (if inpt):  Patient Vitals for the past 24 hrs:   BP Temp Pulse Resp SpO2 Height Weight   04/03/20 2200 121/76    97 %     04/03/20 2152 125/79    97 %     04/03/20 2128 131/66    99 %     04/03/20 2056     97 %     04/03/20 1853     98 %     04/03/20 1852 145/82         04/03/20 1814 125/88 98.3 °F (36.8 °C) 73 16 96 % 5' 6\" (1.676 m) 170 lb (77.1 kg)       Labs:  Recent Labs     04/03/20  1851   WBC 12.1*   HGB 14.1         K 3.8      CO2 31   BUN 8   CREA 0.92   GLU 98   TBILI 0.5   SGOT 14*   ALT 13   AP 94   LPSE 302       Lab Results   Component Value Date/Time    WBC 12.1 (H) 04/03/2020 06:51 PM    HGB 14.1 04/03/2020 06:51 PM    PLATELET 601 08/38/5724 06:51 PM    Sodium 136 04/03/2020 06:51 PM    Potassium 3.8 04/03/2020 06:51 PM    Chloride 101 04/03/2020 06:51 PM    CO2 31 04/03/2020 06:51 PM    BUN 8 04/03/2020 06:51 PM    Creatinine 0.92 04/03/2020 06:51 PM    Glucose 98 04/03/2020 06:51 PM    Bilirubin, total 0.5 04/03/2020 06:51 PM    AST (SGOT) 14 (L) 04/03/2020 06:51 PM    ALT (SGPT) 13 04/03/2020 06:51 PM    Alk. phosphatase 94 04/03/2020 06:51 PM    Lipase 302 04/03/2020 06:51 PM       CT Results  (Last 48 hours)               04/03/20 1955  CT ABD PELV W CONT Final result    Impression:  IMPRESSION:   1. Acute appendicitis with significant periappendiceal fat inflammation. No   obvious periappendiceal abscess. [facility]   The critical results contained in this report were communicated to Dr. Angel Damon   by Dr. Blaire Whitten on 4/3/2020 at 8:00 PM. Critical results were communicated as   outlined in Section II.C.2.a.i of the ACR Practice Guideline for Communication   of Diagnostic Imaging Findings. Narrative:  CT OF THE ABDOMEN AND PELVIS WITH CONTRAST. CLINICAL INDICATION: Right lower quadrant abdominal pain, concern for   appendicitis       PROCEDURE: Serial thin section axial images obtained from the lung bases through   the proximal femurs following the administration of 100 cc of Isovue 370   intravenous contrast.  Radiation dose reduction techniques were used for this   study. Our CT scanners use one or all of the following: Automated exposure   control, adjusted of the mA and/or kV according to patient size, iterative   reconstruction       COMPARISON: No prior       FINDINGS:        CT ABDOMEN: Cholecystectomy clips are present. The liver and spleen are   unremarkable. The adrenal glands are normal. The pancreas is unremarkable. The   kidneys are symmetric in size and contrast enhanced. There is no hydronephrosis. The bowel is normal in course, caliber, and wall thickness. CT PELVIS: A fluid-filled, dilated appendix is appreciated. This lies in the   right hemipelvis it measures 9 mm transverse with surrounding periappendiceal   fat inflammation. A trace amount of reactive free fluid is noted dependently in   the pelvis. No para appendiceal abscess evident. There is no inguinal hernia or   adenopathy.  The bladder is unremarkable. No aggressive osseous lesions identified. chest X-ray      I reviewed recent labs, recent radiologic studies, and pertinent records including other doctor notes if needed. I independently reviewed radiology images for studies I described above or studies I have ordered. Admission date (for inpatients): 4/3/2020   * No surgery date entered *  Procedure(s):  LAPAROSCOPIC APPENDECTOMY    ASSESSMENT/PLAN:  Problem List  Date Reviewed: 4/3/2020          Codes Class Noted    * (Principal) RLQ abdominal pain ICD-10-CM: R10.31  ICD-9-CM: 789.03  4/3/2020        Acute appendicitis ICD-10-CM: K35.80  ICD-9-CM: 540.9  4/3/2020        Leukocytosis ICD-10-CM: B90.021  ICD-9-CM: 288.60  4/3/2020        Current mild episode of major depressive disorder without [facility] episode ([facility] ICD-10-CM: F32.0  ICD-9-CM: 296.21  10/26/2018        Anxiety ICD-10-CM: F41.9  ICD-9-CM: 300.00  7/1/2015        Postmenopausal disorder ICD-10-CM: N95.1  ICD-9-CM: 627.9  7/1/2015        Abnormal weight gain ICD-10-CM: R63.5  ICD-9-CM: 783.1  7/1/2015        Cough ICD-10-CM: R05  ICD-9-CM: 786.2  7/1/2015            Principal Problem:    RLQ abdominal pain (4/3/2020)    Active Problems:    Acute appendicitis (4/3/2020)      Leukocytosis (4/3/2020)          RLQ  Admit for observation  NPO    Acute appendicitis suggested by CT with leukocytosis  IV Zosyn  SCDs  IV pepcid    I discussed the patient's condition with the patient at length and treatment options. I discussed risks of surgery in language the patient could understand including bleeding, infection, need for further surgery, abscess, fistula, SBO, blood clots, need for open surgery, renal failure, pneumonia, etc... Rosy Sharper Rosy Sharper Rosy Deng The patient voiced understanding of all this and all questions were answered. Alternatives to surgery were discussed also and risks of the alternatives. The patient requested that we proceed with surgery.     Informed consent was obtained. We decided to proceed with surgery. OR notified emergently          I have personally performed a face-to-face diagnostic evaluation and management  service on this patient. I have independently seen the patient. I have independently obtained the above history from the patient/family. I have independently examined the patient with above findings. I have independently reviewed data/labs for this patient and developed the above plan of care (MDM). Signed: Antonio Ahmadi.  Alba Clarke MD, FACS

## 2020-04-06 NOTE — ANESTHESIA POSTPROCEDURE EVALUATION
Procedure(s):  LAPAROSCOPIC APPENDECTOMY.     general    Anesthesia Post Evaluation      Multimodal analgesia: multimodal analgesia used between 6 hours prior to anesthesia start to PACU discharge  Patient location during evaluation: PACU  Patient participation: complete - patient participated  Level of consciousness: awake and alert  Pain management: adequate  Airway patency: patent  Anesthetic complications: no  Cardiovascular status: acceptable and hemodynamically stable  Respiratory status: acceptable  Hydration status: acceptable        Vitals Value Taken Time   /72 4/4/2020 12:45 AM   Temp 36.9 °C (98.5 °F) 4/3/2020 11:59 PM   Pulse 69 4/4/2020 12:45 AM   Resp 20 4/4/2020 12:45 AM   SpO2 95 % 4/4/2020 12:45 AM

## 2021-04-16 ENCOUNTER — TRANSCRIBE ORDER (OUTPATIENT)
Dept: SCHEDULING | Age: 56
End: 2021-04-16

## 2021-04-16 DIAGNOSIS — Z12.31 VISIT FOR SCREENING MAMMOGRAM: Primary | ICD-10-CM

## 2022-03-19 PROBLEM — D72.829 LEUKOCYTOSIS: Status: ACTIVE | Noted: 2020-04-03

## 2022-03-19 PROBLEM — F32.0 CURRENT MILD EPISODE OF MAJOR DEPRESSIVE DISORDER WITHOUT PRIOR EPISODE (HCC): Status: ACTIVE | Noted: 2018-10-26

## 2022-03-19 PROBLEM — R10.31 RLQ ABDOMINAL PAIN: Status: ACTIVE | Noted: 2020-04-03

## 2022-03-19 PROBLEM — K35.80 ACUTE APPENDICITIS: Status: ACTIVE | Noted: 2020-04-03

## 2022-07-18 ENCOUNTER — COMMUNITY OUTREACH (OUTPATIENT)
Dept: INTERNAL MEDICINE CLINIC | Facility: CLINIC | Age: 57
End: 2022-07-18

## 2022-07-18 NOTE — PROGRESS NOTES
Patient's HM shows they are overdue for Mammogram and Colorectal Screening. Pintics and  files searched without success. No results to attach to order nor HM updated.

## 2022-11-02 RX ORDER — ESTRADIOL 0.03 MG/D
FILM, EXTENDED RELEASE TRANSDERMAL
Qty: 8 PATCH | Refills: 3 | Status: SHIPPED | OUTPATIENT
Start: 2022-11-02

## 2022-12-13 ENCOUNTER — OFFICE VISIT (OUTPATIENT)
Dept: INTERNAL MEDICINE CLINIC | Facility: CLINIC | Age: 57
End: 2022-12-13
Payer: COMMERCIAL

## 2022-12-13 VITALS
HEART RATE: 70 BPM | BODY MASS INDEX: 24.75 KG/M2 | OXYGEN SATURATION: 100 % | DIASTOLIC BLOOD PRESSURE: 80 MMHG | SYSTOLIC BLOOD PRESSURE: 134 MMHG | HEIGHT: 66 IN | WEIGHT: 154 LBS

## 2022-12-13 DIAGNOSIS — M25.541 METACARPOPHALANGEAL JOINT PAIN OF RIGHT HAND: ICD-10-CM

## 2022-12-13 DIAGNOSIS — Z12.31 ENCOUNTER FOR SCREENING MAMMOGRAM FOR MALIGNANT NEOPLASM OF BREAST: ICD-10-CM

## 2022-12-13 DIAGNOSIS — Z12.11 SCREENING FOR COLON CANCER: ICD-10-CM

## 2022-12-13 DIAGNOSIS — M79.641 PAIN OF RIGHT HAND: Primary | ICD-10-CM

## 2022-12-13 PROCEDURE — 1036F TOBACCO NON-USER: CPT | Performed by: INTERNAL MEDICINE

## 2022-12-13 PROCEDURE — G8484 FLU IMMUNIZE NO ADMIN: HCPCS | Performed by: INTERNAL MEDICINE

## 2022-12-13 PROCEDURE — 99213 OFFICE O/P EST LOW 20 MIN: CPT | Performed by: INTERNAL MEDICINE

## 2022-12-13 PROCEDURE — G8420 CALC BMI NORM PARAMETERS: HCPCS | Performed by: INTERNAL MEDICINE

## 2022-12-13 PROCEDURE — 3017F COLORECTAL CA SCREEN DOC REV: CPT | Performed by: INTERNAL MEDICINE

## 2022-12-13 PROCEDURE — G8427 DOCREV CUR MEDS BY ELIG CLIN: HCPCS | Performed by: INTERNAL MEDICINE

## 2022-12-13 RX ORDER — MELOXICAM 15 MG/1
15 TABLET ORAL DAILY
Qty: 30 TABLET | Refills: 3 | Status: SHIPPED | OUTPATIENT
Start: 2022-12-13

## 2022-12-13 NOTE — PROGRESS NOTES
Katheryne Canavan was seen today for hand pain. Diagnoses and all orders for this visit:    Pain of right hand  Comments:  suspect a ganglionic cyst, try nsaid and consider hand referral e/t if not better. Encounter for screening mammogram for malignant neoplasm of breast  -     [facility] DIGITAL SCREEN W OR WO CAD BILATERAL; Future    Screening for colon cancer  Comments:  need try find [facility]    Other orders  -     meloxicam (MOBIC) 15 MG tablet; Take 1 tablet by mouth daily        Kapil Tenorio is a 56 y.o. female    Chief Complaint   Patient presents with    Hand Pain     RIGHT HAND PAIN AND SWELLING         Office Visit on 02/25/2022   Component Date Value Ref Range Status    Vit D, 25-Hydroxy 02/25/2022 53.8  30.0 - 100.0 ng/mL Final    Comment: Vitamin D deficiency has been defined by the [address] practice guideline as a  level of serum 25-OH vitamin D less than 20 ng/mL (1,2). The Endocrine Society went on to further define vitamin D  insufficiency as a level between 21 and 29 ng/mL (2). 1. IOM ([place] of Medicine). 2010. Dietary reference     intakes for calcium and D. [facility]: The     [place]. 2. Tyrone MF, Kathy NC, Karina MOSES, et al.     Evaluation, treatment, and prevention of vitamin D     deficiency: an Endocrine Society clinical practice     guideline. JCEM. 2011 Jul; 96(7):1911-30.       Vitamin B-12 02/25/2022 726  232 - 1245 pg/mL Final    WBC 02/25/2022 5.8  3.4 - 10.8 x10E3/uL Final    RBC 02/25/2022 4.57  3.77 - 5.28 x10E6/uL Final    Hemoglobin 02/25/2022 13.7  11.1 - 15.9 g/dL Final    Hematocrit 02/25/2022 40.9  34.0 - 46.6 % Final    MCV 02/25/2022 90  79 - 97 fL Final    MCH 02/25/2022 30.0  26.6 - 33.0 pg Final    MCHC 02/25/2022 33.5  31.5 - 35.7 g/dL Final    RDW 02/25/2022 12.6  11.7 - 15.4 % Final    Platelets 51/45/9427 284  150 - 450 x10E3/uL Final    Neutrophils % 02/25/2022 75  Not Estab. % Final    Lymphocytes % 02/25/2022 19  Not Estab. % Final    Monocytes % 02/25/2022 5  Not Estab. % Final    Eosinophils % 02/25/2022 1  Not Estab. % Final    Basophils % 02/25/2022 0  Not Estab. % Final    Neutrophils Absolute 02/25/2022 4.4  1.4 - 7.0 x10E3/uL Final    Lymphocytes Absolute 02/25/2022 1.1  0.7 - 3.1 x10E3/uL Final    Monocytes Absolute 02/25/2022 0.3  0.1 - 0.9 x10E3/uL Final    Eosinophils Absolute 02/25/2022 0.1  0.0 - 0.4 x10E3/uL Final    Basophils Absolute 02/25/2022 0.0  0.0 - 0.2 x10E3/uL Final    Immature Granulocytes 02/25/2022 0  Not Estab. % Final    Granulocyte Absolute Count 02/25/2022 0.0  0.0 - 0.1 x10E3/uL Final    TSH 02/25/2022 1.930  0.450 - 4.500 uIU/mL Final    T4, Total 02/25/2022 7.0  4.5 - 12.0 ug/dL Final    Glucose 02/25/2022 81  65 - 99 mg/dL Final    BUN 02/25/2022 11  6 - 24 mg/dL Final    Creatinine 02/25/2022 0.80  0.57 - 1.00 mg/dL Final    Comment:                **Effective February 28, 2022 Ethan Sinha will begin**                   reporting the 2021 CKD-EPI creatinine equation that                   estimates kidney function without a race variable. EGFR IF NonAfrican American 02/25/2022 83  >59 mL/min/1.73 Final    GFR  02/25/2022 96  >59 mL/min/1.73 Final    Comment: **In accordance with recommendations from the NKF-ASN Task force,**    [facility] is in the process of updating its eGFR calculation to the    2021 CKD-EPI creatinine equation that estimates kidney function    without a race variable.       Bun/Cre Ratio 02/25/2022 14  9 - 23 NA Final    Sodium 02/25/2022 142  134 - 144 mmol/L Final    Potassium 02/25/2022 4.3  3.5 - 5.2 mmol/L Final    Chloride 02/25/2022 103  96 - 106 mmol/L Final    CO2 02/25/2022 23  20 - 29 mmol/L Final    Calcium 02/25/2022 9.0  8.7 - 10.2 mg/dL Final    Total Protein 02/25/2022 6.5  6.0 - 8.5 g/dL Final    Albumin 02/25/2022 4.3  3.8 - 4.9 g/dL Final    Globulin, Total 02/25/2022 2.2  1.5 - 4.5 g/dL Final Albumin/Globulin Ratio 2022 2.0  1.2 - 2.2 NA Final    Total Bilirubin 2022 0.2  0.0 - 1.2 mg/dL Final    Alkaline Phosphatase 2022 83  44 - 121 IU/L Final    AST 2022 26  0 - 40 IU/L Final    ALT 2022 19  0 - 32 IU/L Final    MONONUCLEOSIS TEST, Esthela Carreono [id number] 2022 Negative  Negative Final    Comment: The sensitivity of Heterophile antibody testing is 80-90%. Milagro Netter IgM testing offers higher sensitivity. TIBC 2022 322  250 - 450 ug/dL Final    UIBC 2022 242  131 - 425 ug/dL Final    Iron 2022 80  27 - 159 ug/dL Final    Iron Saturation 2022 25  15 - 55 % Final        Past Medical History:   Diagnosis Date    Anxiety, generalized     Menopausal disorder     Menopause        Family History   Problem Relation Age of Onset    Breast Cancer Paternal Aunt     Cancer Maternal Grandmother     Breast Cancer Paternal Grandmother     Diabetes Neg Hx     Heart Disease Neg Hx         Social History     Socioeconomic History    Marital status:      Spouse name: Not on file    Number of children: Not on file    Years of education: Not on file    Highest education level: Not on file   Occupational History    Not on file   Tobacco Use    Smoking status: Former     Packs/day: 1.00     Years: 15.00     Pack years: 15.00     Types: Cigarettes     Quit date: 2000     Years since quittin.9    Smokeless tobacco: Never    Tobacco comments:     Quit smoking: about a pack a day for 13-15 years   Substance and Sexual Activity    Alcohol use:  Yes     Alcohol/week: 0.0 standard drinks    Drug use: No    Sexual activity: Not on file     Comment: hysterectomy   Other Topics Concern    Not on file   Social History Narrative    Not on file     Social Determinants of Health     Financial Resource Strain: Not on file   Food Insecurity: Not on file   Transportation Needs: Not on file   Physical Activity: Not on file   Stress: Not on file   Social Connections: Not on file   Intimate Partner Violence: Not on file   Housing Stability: Not on file         Current Outpatient Medications:     meloxicam (MOBIC) 15 MG tablet, Take 1 tablet by mouth daily, Disp: 30 tablet, Rfl: 3    estradiol 0.025 MG/24HR PTTW, APPLY 1 PATCH EXTERNALLY TO THE SKIN 2 TIMES A WEEK, Disp: 8 patch, Rfl: 3    ibuprofen (ADVIL;MOTRIN) 200 MG tablet, Take 800 mg by mouth 2 times daily, Disp: , Rfl:     venlafaxine (EFFEXOR XR) 150 MG extended release capsule, Take 150 mg by mouth daily, Disp: , Rfl:     No Known Allergies      Review of Systems      Vitals:    12/13/22 1344   BP: 134/80   Pulse: 70   SpO2: 100%   Weight: 154 lb (69.9 kg)   Height: 5' 6\" (1.676 m)           Physical Exam  Vitals reviewed. Cardiovascular:      Rate and Rhythm: Normal rate and regular rhythm. Musculoskeletal:         General: Swelling and tenderness present. No signs of injury. Right hand: Tenderness present. No swelling. Normal range of motion. Arms:       Comments: Pain tenedrness mc/p jt smallest finger, ?ganglion deep to pad rt ant hand mc/p jt   Neurological:      Mental Status: She is alert. Eugene Alvarado was seen today for hand pain. Diagnoses and all orders for this visit:    Pain of right hand  Comments:  suspect a ganglionic cyst, try nsaid and consider hand referral e/t if not better. Encounter for screening mammogram for malignant neoplasm of breast  -     [facility] DIGITAL SCREEN W OR WO CAD BILATERAL; Future    Screening for colon cancer  Comments:  need try find [facility]    Other orders  -     meloxicam (MOBIC) 15 MG tablet;  Take 1 tablet by mouth daily               Madelyn Almaguer, DO

## 2023-01-13 ENCOUNTER — HOSPITAL ENCOUNTER (OUTPATIENT)
Dept: MAMMOGRAPHY | Age: 58
Discharge: HOME | End: 2023-01-13
Payer: COMMERCIAL

## 2023-01-13 DIAGNOSIS — Z12.31 ENCOUNTER FOR SCREENING MAMMOGRAM FOR MALIGNANT NEOPLASM OF BREAST: ICD-10-CM

## 2023-01-13 PROCEDURE — 77067 SCR MAMMO BI INCL CAD: CPT

## 2023-02-27 ENCOUNTER — TELEPHONE (OUTPATIENT)
Dept: INTERNAL MEDICINE CLINIC | Facility: CLINIC | Age: 58
End: 2023-02-27

## 2023-02-27 RX ORDER — ESTRADIOL 0.03 MG/D
FILM, EXTENDED RELEASE TRANSDERMAL
Qty: 8 PATCH | Refills: 3 | Status: SHIPPED | OUTPATIENT
Start: 2023-02-27

## 2023-05-19 ENCOUNTER — OFFICE VISIT (OUTPATIENT)
Dept: INTERNAL MEDICINE CLINIC | Facility: CLINIC | Age: 58
End: 2023-05-19
Payer: COMMERCIAL

## 2023-05-19 VITALS
SYSTOLIC BLOOD PRESSURE: 120 MMHG | HEART RATE: 61 BPM | OXYGEN SATURATION: 99 % | HEIGHT: 66 IN | DIASTOLIC BLOOD PRESSURE: 78 MMHG | BODY MASS INDEX: 27 KG/M2 | WEIGHT: 168 LBS

## 2023-05-19 DIAGNOSIS — G25.81 RESTLESS LEG SYNDROME: ICD-10-CM

## 2023-05-19 DIAGNOSIS — F41.0 GENERALIZED ANXIETY DISORDER WITH PANIC ATTACKS: ICD-10-CM

## 2023-05-19 DIAGNOSIS — Z79.890 HORMONE REPLACEMENT THERAPY: ICD-10-CM

## 2023-05-19 DIAGNOSIS — Z12.11 SCREENING FOR COLON CANCER: Primary | ICD-10-CM

## 2023-05-19 DIAGNOSIS — F41.1 GENERALIZED ANXIETY DISORDER WITH PANIC ATTACKS: ICD-10-CM

## 2023-05-19 DIAGNOSIS — F32.2 SEVERE DEPRESSION (HCC): ICD-10-CM

## 2023-05-19 PROCEDURE — G8427 DOCREV CUR MEDS BY ELIG CLIN: HCPCS | Performed by: INTERNAL MEDICINE

## 2023-05-19 PROCEDURE — 1036F TOBACCO NON-USER: CPT | Performed by: INTERNAL MEDICINE

## 2023-05-19 PROCEDURE — 3017F COLORECTAL CA SCREEN DOC REV: CPT | Performed by: INTERNAL MEDICINE

## 2023-05-19 PROCEDURE — 99214 OFFICE O/P EST MOD 30 MIN: CPT | Performed by: INTERNAL MEDICINE

## 2023-05-19 PROCEDURE — G8419 CALC BMI OUT NRM PARAM NOF/U: HCPCS | Performed by: INTERNAL MEDICINE

## 2023-05-19 RX ORDER — ESTRADIOL 0.03 MG/D
FILM, EXTENDED RELEASE TRANSDERMAL
Qty: 8 PATCH | Refills: 11 | Status: SHIPPED | OUTPATIENT
Start: 2023-05-19

## 2023-05-19 RX ORDER — VENLAFAXINE HYDROCHLORIDE 150 MG/1
150 CAPSULE, EXTENDED RELEASE ORAL DAILY
Qty: 90 CAPSULE | Refills: 3 | Status: SHIPPED | OUTPATIENT
Start: 2023-05-19

## 2023-05-19 RX ORDER — TRAZODONE HYDROCHLORIDE 100 MG/1
100 TABLET ORAL NIGHTLY
Qty: 90 TABLET | Refills: 1 | Status: SHIPPED | OUTPATIENT
Start: 2023-05-19

## 2023-05-19 RX ORDER — PROPRANOLOL HYDROCHLORIDE 10 MG/1
TABLET ORAL
COMMUNITY
Start: 2023-05-17 | End: 2023-05-19 | Stop reason: SDUPTHER

## 2023-05-19 RX ORDER — GABAPENTIN 400 MG/1
CAPSULE ORAL
COMMUNITY
Start: 2023-05-17 | End: 2023-05-19 | Stop reason: SDUPTHER

## 2023-05-19 RX ORDER — PROPRANOLOL HYDROCHLORIDE 10 MG/1
TABLET ORAL
Qty: 90 TABLET | Refills: 5 | Status: SHIPPED | OUTPATIENT
Start: 2023-05-19

## 2023-05-19 RX ORDER — TRAZODONE HYDROCHLORIDE 100 MG/1
TABLET ORAL
COMMUNITY
Start: 2023-05-17 | End: 2023-05-19 | Stop reason: SDUPTHER

## 2023-05-19 RX ORDER — ROPINIROLE 1 MG/1
TABLET, FILM COATED ORAL
COMMUNITY
Start: 2023-05-17 | End: 2023-05-19 | Stop reason: SDUPTHER

## 2023-05-19 RX ORDER — GABAPENTIN 400 MG/1
CAPSULE ORAL
Qty: 90 CAPSULE | Refills: 5 | Status: SHIPPED | OUTPATIENT
Start: 2023-05-19 | End: 2023-11-19

## 2023-05-19 RX ORDER — ROPINIROLE 1 MG/1
TABLET, FILM COATED ORAL
Qty: 90 TABLET | Refills: 1 | Status: SHIPPED | OUTPATIENT
Start: 2023-05-19

## 2023-05-19 SDOH — ECONOMIC STABILITY: INCOME INSECURITY: HOW HARD IS IT FOR YOU TO PAY FOR THE VERY BASICS LIKE FOOD, HOUSING, MEDICAL CARE, AND HEATING?: NOT HARD AT ALL

## 2023-05-19 SDOH — ECONOMIC STABILITY: HOUSING INSECURITY
IN THE LAST 12 MONTHS, WAS THERE A TIME WHEN YOU DID NOT HAVE A STEADY PLACE TO SLEEP OR SLEPT IN A SHELTER (INCLUDING NOW)?: NO

## 2023-05-19 SDOH — ECONOMIC STABILITY: FOOD INSECURITY: WITHIN THE PAST 12 MONTHS, THE FOOD YOU BOUGHT JUST DIDN'T LAST AND YOU DIDN'T HAVE MONEY TO GET MORE.: NEVER TRUE

## 2023-05-19 SDOH — ECONOMIC STABILITY: FOOD INSECURITY: WITHIN THE PAST 12 MONTHS, YOU WORRIED THAT YOUR FOOD WOULD RUN OUT BEFORE YOU GOT MONEY TO BUY MORE.: NEVER TRUE

## 2023-05-19 ASSESSMENT — ENCOUNTER SYMPTOMS
WHEEZING: 0
CONSTIPATION: 0
SHORTNESS OF BREATH: 0
DIARRHEA: 0
NAUSEA: 0
SINUS PAIN: 0
VOMITING: 0
ABDOMINAL PAIN: 0

## 2023-05-19 ASSESSMENT — PATIENT HEALTH QUESTIONNAIRE - PHQ9
4. FEELING TIRED OR HAVING LITTLE ENERGY: 2
SUM OF ALL RESPONSES TO PHQ9 QUESTIONS 1 & 2: 0
9. THOUGHTS THAT YOU WOULD BE BETTER OFF DEAD, OR OF HURTING YOURSELF: 0
8. MOVING OR SPEAKING SO SLOWLY THAT OTHER PEOPLE COULD HAVE NOTICED. OR THE OPPOSITE, BEING SO FIGETY OR RESTLESS THAT YOU HAVE BEEN MOVING AROUND A LOT MORE THAN USUAL: 0
1. LITTLE INTEREST OR PLEASURE IN DOING THINGS: 0
SUM OF ALL RESPONSES TO PHQ QUESTIONS 1-9: 5
10. IF YOU CHECKED OFF ANY PROBLEMS, HOW DIFFICULT HAVE THESE PROBLEMS MADE IT FOR YOU TO DO YOUR WORK, TAKE CARE OF THINGS AT HOME, OR GET ALONG WITH OTHER PEOPLE: 0
6. FEELING BAD ABOUT YOURSELF - OR THAT YOU ARE A FAILURE OR HAVE LET YOURSELF OR YOUR FAMILY DOWN: 0
SUM OF ALL RESPONSES TO PHQ QUESTIONS 1-9: 5
7. TROUBLE CONCENTRATING ON THINGS, SUCH AS READING THE NEWSPAPER OR WATCHING TELEVISION: 0
SUM OF ALL RESPONSES TO PHQ QUESTIONS 1-9: 5
2. FEELING DOWN, DEPRESSED OR HOPELESS: 0
3. TROUBLE FALLING OR STAYING ASLEEP: 3
SUM OF ALL RESPONSES TO PHQ QUESTIONS 1-9: 5
5. POOR APPETITE OR OVEREATING: 0

## 2023-08-10 ENCOUNTER — CLINICAL DOCUMENTATION (OUTPATIENT)
Dept: SURGERY | Age: 58
End: 2023-08-10

## 2023-08-10 NOTE — PROGRESS NOTES
I have reviewed the patient's chart and consider the patient an acceptable risk for screening colonoscopy without a formal office visit. We will contact the patient to give the details of the bowel prep and to schedule screening colonoscopy in the near future. Colonoscopy: none on file in Epic  Anticoagulation: none  Family Hx: non contributory     Once the colonoscopy has been completed, the Health Maintenance will be updated accordingly.      Gabriel Conway, APRN - CNP

## 2023-09-06 ENCOUNTER — TELEPHONE (OUTPATIENT)
Dept: INTERNAL MEDICINE CLINIC | Facility: CLINIC | Age: 58
End: 2023-09-06

## 2023-09-06 NOTE — TELEPHONE ENCOUNTER
Needs refill on    venlafaxine (EFFEXOR XR) 150 MG extended release capsule    Send to   [facility] [address] [place] [address],[address], [address]

## 2023-09-29 ENCOUNTER — OFFICE VISIT (OUTPATIENT)
Dept: INTERNAL MEDICINE CLINIC | Facility: CLINIC | Age: 58
End: 2023-09-29
Payer: COMMERCIAL

## 2023-09-29 VITALS
SYSTOLIC BLOOD PRESSURE: 116 MMHG | WEIGHT: 171 LBS | OXYGEN SATURATION: 100 % | BODY MASS INDEX: 27.48 KG/M2 | HEART RATE: 60 BPM | TEMPERATURE: 97.4 F | DIASTOLIC BLOOD PRESSURE: 78 MMHG | HEIGHT: 66 IN

## 2023-09-29 DIAGNOSIS — F41.0 GENERALIZED ANXIETY DISORDER WITH PANIC ATTACKS: ICD-10-CM

## 2023-09-29 DIAGNOSIS — Z12.31 ENCOUNTER FOR SCREENING MAMMOGRAM FOR MALIGNANT NEOPLASM OF BREAST: Primary | ICD-10-CM

## 2023-09-29 DIAGNOSIS — F41.1 GENERALIZED ANXIETY DISORDER WITH PANIC ATTACKS: ICD-10-CM

## 2023-09-29 DIAGNOSIS — G25.81 RESTLESS LEG SYNDROME: ICD-10-CM

## 2023-09-29 DIAGNOSIS — F32.2 SEVERE DEPRESSION (HCC): ICD-10-CM

## 2023-09-29 PROCEDURE — 3017F COLORECTAL CA SCREEN DOC REV: CPT | Performed by: INTERNAL MEDICINE

## 2023-09-29 PROCEDURE — 99214 OFFICE O/P EST MOD 30 MIN: CPT | Performed by: INTERNAL MEDICINE

## 2023-09-29 PROCEDURE — G8419 CALC BMI OUT NRM PARAM NOF/U: HCPCS | Performed by: INTERNAL MEDICINE

## 2023-09-29 PROCEDURE — 1036F TOBACCO NON-USER: CPT | Performed by: INTERNAL MEDICINE

## 2023-09-29 PROCEDURE — G8427 DOCREV CUR MEDS BY ELIG CLIN: HCPCS | Performed by: INTERNAL MEDICINE

## 2023-09-29 RX ORDER — TRAZODONE HYDROCHLORIDE 100 MG/1
100 TABLET ORAL NIGHTLY
Qty: 90 TABLET | Refills: 1 | Status: SHIPPED | OUTPATIENT
Start: 2023-09-29

## 2023-09-29 RX ORDER — GABAPENTIN 300 MG/1
CAPSULE ORAL
Qty: 180 CAPSULE | Refills: 5 | Status: SHIPPED | OUTPATIENT
Start: 2023-09-29 | End: 2024-03-31

## 2023-09-29 RX ORDER — ROPINIROLE 1 MG/1
TABLET, FILM COATED ORAL
Qty: 90 TABLET | Refills: 1 | Status: SHIPPED | OUTPATIENT
Start: 2023-09-29

## 2023-09-29 ASSESSMENT — ENCOUNTER SYMPTOMS
SHORTNESS OF BREATH: 0
CONSTIPATION: 0
WHEEZING: 0
ABDOMINAL PAIN: 0
NAUSEA: 0
SINUS PAIN: 0
VOMITING: 0
DIARRHEA: 0

## 2023-09-29 NOTE — PROGRESS NOTES
Cheryl Marin was seen today for medication refill. Diagnoses and all orders for this visit:    Encounter for screening mammogram for malignant neoplasm of breast  -     [facility] DIGITAL SCREEN UNILATERAL LEFT; Future    Restless leg syndrome  -     rOPINIRole (REQUIP) 1 MG tablet; TAKE 1 TABLET BY MOUTH AT BEDTIME FOR RESTLESS LEG SYNDROME  -     gabapentin (NEURONTIN) 300 MG capsule; TAKE 2 CAPSULE BY MOUTH THREE TIMES DAILY FOR RESTLESS LEG SYNDROME OR PAIN    Generalized anxiety disorder with panic attacks  -     traZODone (DESYREL) 100 MG tablet; Take 1 tablet by mouth nightly    Severe depression (HCC)  -     traZODone (DESYREL) 100 MG tablet; Take 1 tablet by mouth nightly          Vicente Leavitt is a 66 y.o. female    Chief Complaint   Patient presents with    Medication Refill     And ? Increase dosage on Gabapentin   Anxiety  Things going well        Office Visit on 02/25/2022   Component Date Value Ref Range Status    Vit D, 25-Hydroxy 02/25/2022 53.8  30.0 - 100.0 ng/mL Final    Comment: Vitamin D deficiency has been defined by the [address] [facility] practice guideline as a  level of serum 25-OH vitamin D less than 20 ng/mL (1,2). The Endocrine Society went on to further define vitamin D  insufficiency as a level between 21 and 29 ng/mL (2). 1. IOM ([place] of Medicine). 2010. Dietary reference     intakes for calcium and D. [place]: The     [employer]. 2. Tyrone MF, Kathy BORJAS, Karina MOSES, et al.     Evaluation, treatment, and prevention of vitamin D     deficiency: an Endocrine Society clinical practice     guideline. JCEM. 2011 Jul; 96(7):1911-30.       Vitamin B-12 02/25/2022 726  232 - 1245 pg/mL Final    WBC 02/25/2022 5.8  3.4 - 10.8 x10E3/uL Final    RBC 02/25/2022 4.57  3.77 - 5.28 x10E6/uL Final    Hemoglobin 02/25/2022 13.7  11.1 - 15.9 g/dL Final    Hematocrit 02/25/2022 40.9  34.0 - 46.6 % Final    MCV 02/25/2022 90  79 - 97 fL Final    MCH

## 2023-11-13 ENCOUNTER — TELEPHONE (OUTPATIENT)
Dept: INTERNAL MEDICINE CLINIC | Facility: CLINIC | Age: 58
End: 2023-11-13

## 2023-11-13 DIAGNOSIS — F41.0 GENERALIZED ANXIETY DISORDER WITH PANIC ATTACKS: ICD-10-CM

## 2023-11-13 DIAGNOSIS — F41.1 GENERALIZED ANXIETY DISORDER WITH PANIC ATTACKS: ICD-10-CM

## 2023-11-13 RX ORDER — PROPRANOLOL HYDROCHLORIDE 10 MG/1
TABLET ORAL
Qty: 90 TABLET | Refills: 5 | Status: SHIPPED | OUTPATIENT
Start: 2023-11-13

## 2023-11-13 NOTE — TELEPHONE ENCOUNTER
Needs refill on    propranolol (INDERAL) 10 MG tablet    Was not sent in Sept when she was here    Send to   [address] [place] [address]

## 2023-11-22 DIAGNOSIS — F41.0 GENERALIZED ANXIETY DISORDER WITH PANIC ATTACKS: ICD-10-CM

## 2023-11-22 DIAGNOSIS — G25.81 RESTLESS LEG SYNDROME: ICD-10-CM

## 2023-11-22 DIAGNOSIS — F32.2 SEVERE DEPRESSION (HCC): ICD-10-CM

## 2023-11-22 DIAGNOSIS — F41.1 GENERALIZED ANXIETY DISORDER WITH PANIC ATTACKS: ICD-10-CM

## 2023-11-26 RX ORDER — TRAZODONE HYDROCHLORIDE 100 MG/1
100 TABLET ORAL NIGHTLY
Qty: 90 TABLET | Refills: 1 | Status: SHIPPED | OUTPATIENT
Start: 2023-11-26

## 2023-11-26 RX ORDER — ROPINIROLE 1 MG/1
TABLET, FILM COATED ORAL
Qty: 90 TABLET | Refills: 1 | Status: SHIPPED | OUTPATIENT
Start: 2023-11-26

## 2023-12-08 ENCOUNTER — PATIENT MESSAGE (OUTPATIENT)
Dept: INTERNAL MEDICINE CLINIC | Facility: CLINIC | Age: 58
End: 2023-12-08

## 2023-12-08 ENCOUNTER — OFFICE VISIT (OUTPATIENT)
Dept: BEHAVIORAL/MENTAL HEALTH CLINIC | Age: 58
End: 2023-12-08

## 2023-12-08 VITALS
BODY MASS INDEX: 27.48 KG/M2 | WEIGHT: 171 LBS | HEIGHT: 66 IN | DIASTOLIC BLOOD PRESSURE: 88 MMHG | HEART RATE: 57 BPM | SYSTOLIC BLOOD PRESSURE: 128 MMHG | OXYGEN SATURATION: 98 %

## 2023-12-08 DIAGNOSIS — F43.10 COMPLEX POSTTRAUMATIC STRESS DISORDER: Primary | ICD-10-CM

## 2023-12-08 DIAGNOSIS — F33.42 MDD (MAJOR DEPRESSIVE DISORDER), RECURRENT, IN FULL REMISSION (HCC): ICD-10-CM
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
## 2023-12-09 ENCOUNTER — HOSPITAL ENCOUNTER (INPATIENT)
Age: 58
LOS: 5 days | Discharge: HOME HEALTH | DRG: 493 | End: 2023-12-14
Attending: EMERGENCY MEDICINE | Admitting: INTERNAL MEDICINE
Payer: COMMERCIAL

## 2023-12-09 ENCOUNTER — APPOINTMENT (OUTPATIENT)
Dept: GENERAL RADIOLOGY | Age: 58
DRG: 493 | End: 2023-12-09
Payer: COMMERCIAL

## 2023-12-09 DIAGNOSIS — S82.252A CLOSED DISPLACED COMMINUTED FRACTURE OF SHAFT OF LEFT TIBIA, INITIAL ENCOUNTER: Primary | ICD-10-CM

## 2023-12-09 DIAGNOSIS — S82.832A CLOSED FRACTURE OF PROXIMAL END OF LEFT FIBULA, UNSPECIFIED FRACTURE MORPHOLOGY, INITIAL ENCOUNTER: ICD-10-CM

## 2023-12-09 PROBLEM — S82.899A ANKLE FRACTURE: Status: ACTIVE | Noted: 2023-12-09

## 2023-12-09 PROBLEM — S82.202A FRACTURE OF TIBIAL SHAFT, LEFT, CLOSED: Status: ACTIVE | Noted: 2023-12-09
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
## 2023-12-09 PROBLEM — F39 MOOD DISORDER (HCC): Status: ACTIVE | Noted: 2023-12-09

## 2023-12-09 LAB — CREAT SERPL-MCNC: 0.8 MG/DL (ref 0.6–1)

## 2023-12-09 PROCEDURE — 29515 APPLICATION SHORT LEG SPLINT: CPT

## 2023-12-09 PROCEDURE — 6370000000 HC RX 637 (ALT 250 FOR IP): Performed by: FAMILY MEDICINE

## 2023-12-09 PROCEDURE — 96374 THER/PROPH/DIAG INJ IV PUSH: CPT

## 2023-12-09 PROCEDURE — 96375 TX/PRO/DX INJ NEW DRUG ADDON: CPT

## 2023-12-09 PROCEDURE — 99285 EMERGENCY DEPT VISIT HI MDM: CPT

## 2023-12-09 PROCEDURE — 73610 X-RAY EXAM OF ANKLE: CPT

## 2023-12-09 PROCEDURE — 2580000003 HC RX 258: Performed by: INTERNAL MEDICINE

## 2023-12-09 PROCEDURE — 82565 ASSAY OF CREATININE: CPT

## 2023-12-09 PROCEDURE — 6360000002 HC RX W HCPCS: Performed by: EMERGENCY MEDICINE

## 2023-12-09 PROCEDURE — 73590 X-RAY EXAM OF LOWER LEG: CPT

## 2023-12-09 PROCEDURE — 6360000002 HC RX W HCPCS: Performed by: INTERNAL MEDICINE

## 2023-12-09 PROCEDURE — 1100000000 HC RM PRIVATE

## 2023-12-09 PROCEDURE — 6360000002 HC RX W HCPCS: Performed by: FAMILY MEDICINE

## 2023-12-09 PROCEDURE — 2500000003 HC RX 250 WO HCPCS: Performed by: EMERGENCY MEDICINE

## 2023-12-09 PROCEDURE — 99223 1ST HOSP IP/OBS HIGH 75: CPT | Performed by: PHYSICIAN ASSISTANT

## 2023-12-09 RX ORDER — ENOXAPARIN SODIUM 100 MG/ML
40 INJECTION SUBCUTANEOUS DAILY
Status: DISCONTINUED | OUTPATIENT
Start: 2023-12-10 | End: 2023-12-12

## 2023-12-09 RX ORDER — GABAPENTIN 300 MG/1
300 CAPSULE ORAL 3 TIMES DAILY
Status: CANCELLED | OUTPATIENT
Start: 2023-12-09

## 2023-12-09 RX ORDER — MORPHINE SULFATE 2 MG/ML
4 INJECTION, SOLUTION INTRAMUSCULAR; INTRAVENOUS EVERY 4 HOURS PRN
Status: DISCONTINUED | OUTPATIENT
Start: 2023-12-09 | End: 2023-12-10

## 2023-12-09 RX ORDER — PROPRANOLOL HYDROCHLORIDE 10 MG/1
10 TABLET ORAL 3 TIMES DAILY
Status: CANCELLED | OUTPATIENT
Start: 2023-12-09

## 2023-12-09 RX ORDER — MORPHINE SULFATE 2 MG/ML
2 INJECTION, SOLUTION INTRAMUSCULAR; INTRAVENOUS EVERY 4 HOURS PRN
Status: DISCONTINUED | OUTPATIENT
Start: 2023-12-09 | End: 2023-12-09

## 2023-12-09 RX ORDER — POTASSIUM CHLORIDE 20 MEQ/1
40 TABLET, EXTENDED RELEASE ORAL PRN
Status: DISCONTINUED | OUTPATIENT
Start: 2023-12-09 | End: 2023-12-14 | Stop reason: HOSPADM

## 2023-12-09 RX ORDER — SODIUM CHLORIDE 9 MG/ML
INJECTION, SOLUTION INTRAVENOUS PRN
Status: DISCONTINUED | OUTPATIENT
Start: 2023-12-09 | End: 2023-12-14 | Stop reason: HOSPADM

## 2023-12-09 RX ORDER — ACETAMINOPHEN 650 MG/1
650 SUPPOSITORY RECTAL EVERY 6 HOURS PRN
Status: DISCONTINUED | OUTPATIENT
Start: 2023-12-09 | End: 2023-12-10

## 2023-12-09 RX ORDER — MAGNESIUM SULFATE IN WATER 40 MG/ML
2000 INJECTION, SOLUTION INTRAVENOUS PRN
Status: DISCONTINUED | OUTPATIENT
Start: 2023-12-09 | End: 2023-12-14 | Stop reason: HOSPADM

## 2023-12-09 RX ORDER — ONDANSETRON 2 MG/ML
4 INJECTION INTRAMUSCULAR; INTRAVENOUS
Status: COMPLETED | OUTPATIENT
Start: 2023-12-09 | End: 2023-12-09

## 2023-12-09 RX ORDER — TRAZODONE HYDROCHLORIDE 50 MG/1
100 TABLET ORAL NIGHTLY
Status: CANCELLED | OUTPATIENT
Start: 2023-12-09

## 2023-12-09 RX ORDER — VENLAFAXINE HYDROCHLORIDE 150 MG/1
150 CAPSULE, EXTENDED RELEASE ORAL DAILY
Status: CANCELLED | OUTPATIENT
Start: 2023-12-10

## 2023-12-09 RX ORDER — POTASSIUM CHLORIDE 7.45 MG/ML
10 INJECTION INTRAVENOUS PRN
Status: DISCONTINUED | OUTPATIENT
Start: 2023-12-09 | End: 2023-12-14 | Stop reason: HOSPADM

## 2023-12-09 RX ORDER — ONDANSETRON 2 MG/ML
4 INJECTION INTRAMUSCULAR; INTRAVENOUS EVERY 6 HOURS PRN
Status: DISCONTINUED | OUTPATIENT
Start: 2023-12-09 | End: 2023-12-14 | Stop reason: HOSPADM

## 2023-12-09 RX ORDER — ONDANSETRON 4 MG/1
4 TABLET, ORALLY DISINTEGRATING ORAL EVERY 8 HOURS PRN
Status: DISCONTINUED | OUTPATIENT
Start: 2023-12-09 | End: 2023-12-14 | Stop reason: HOSPADM

## 2023-12-09 RX ORDER — ROPINIROLE 1 MG/1
1 TABLET, FILM COATED ORAL NIGHTLY
Status: CANCELLED | OUTPATIENT
Start: 2023-12-09

## 2023-12-09 RX ORDER — SODIUM CHLORIDE 0.9 % (FLUSH) 0.9 %
5-40 SYRINGE (ML) INJECTION EVERY 12 HOURS SCHEDULED
Status: DISCONTINUED | OUTPATIENT
Start: 2023-12-09 | End: 2023-12-14 | Stop reason: HOSPADM

## 2023-12-09 RX ORDER — SODIUM CHLORIDE 0.9 % (FLUSH) 0.9 %
5-40 SYRINGE (ML) INJECTION PRN
Status: DISCONTINUED | OUTPATIENT
Start: 2023-12-09 | End: 2023-12-14 | Stop reason: HOSPADM

## 2023-12-09 RX ORDER — POLYETHYLENE GLYCOL 3350 17 G/17G
17 POWDER, FOR SOLUTION ORAL DAILY PRN
Status: DISCONTINUED | OUTPATIENT
Start: 2023-12-09 | End: 2023-12-14 | Stop reason: HOSPADM

## 2023-12-09 RX ORDER — HYDROMORPHONE HYDROCHLORIDE 1 MG/ML
1 INJECTION, SOLUTION INTRAMUSCULAR; INTRAVENOUS; SUBCUTANEOUS
Status: COMPLETED | OUTPATIENT
Start: 2023-12-09 | End: 2023-12-09

## 2023-12-09 RX ORDER — ACETAMINOPHEN 325 MG/1
650 TABLET ORAL EVERY 6 HOURS PRN
Status: DISCONTINUED | OUTPATIENT
Start: 2023-12-09 | End: 2023-12-10

## 2023-12-09 RX ORDER — HYDROMORPHONE HYDROCHLORIDE 1 MG/ML
0.5 INJECTION, SOLUTION INTRAMUSCULAR; INTRAVENOUS; SUBCUTANEOUS
Status: COMPLETED | OUTPATIENT
Start: 2023-12-09 | End: 2023-12-09

## 2023-12-09 RX ORDER — OXYCODONE HYDROCHLORIDE 5 MG/1
10 TABLET ORAL EVERY 4 HOURS PRN
Status: DISCONTINUED | OUTPATIENT
Start: 2023-12-09 | End: 2023-12-12

## 2023-12-09 RX ADMIN — OXYCODONE 10 MG: 5 TABLET ORAL at 22:01

## 2023-12-09 RX ADMIN — ONDANSETRON 4 MG: 2 INJECTION INTRAMUSCULAR; INTRAVENOUS at 13:04

## 2023-12-09 RX ADMIN — SODIUM CHLORIDE, PRESERVATIVE FREE 10 ML: 5 INJECTION INTRAVENOUS at 22:02

## 2023-12-09 RX ADMIN — HYDROMORPHONE HYDROCHLORIDE 1 MG: 1 INJECTION, SOLUTION INTRAMUSCULAR; INTRAVENOUS; SUBCUTANEOUS at 14:29

## 2023-12-09 RX ADMIN — MORPHINE SULFATE 4 MG: 2 INJECTION, SOLUTION INTRAMUSCULAR; INTRAVENOUS at 22:44

## 2023-12-09 RX ADMIN — HYDROMORPHONE HYDROCHLORIDE 0.5 MG: 1 INJECTION, SOLUTION INTRAMUSCULAR; INTRAVENOUS; SUBCUTANEOUS at 13:04

## 2023-12-09 RX ADMIN — MORPHINE SULFATE 2 MG: 2 INJECTION, SOLUTION INTRAMUSCULAR; INTRAVENOUS at 18:15

## 2023-12-09 ASSESSMENT — PAIN DESCRIPTION - LOCATION
LOCATION: LEG
LOCATION: ANKLE
LOCATION: LEG
LOCATION: LEG

## 2023-12-09 ASSESSMENT — PAIN SCALES - GENERAL
PAINLEVEL_OUTOF10: 5
PAINLEVEL_OUTOF10: 10
PAINLEVEL_OUTOF10: 5
PAINLEVEL_OUTOF10: 10
PAINLEVEL_OUTOF10: 7
PAINLEVEL_OUTOF10: 5
PAINLEVEL_OUTOF10: 4
PAINLEVEL_OUTOF10: 7
PAINLEVEL_OUTOF10: 8

## 2023-12-09 ASSESSMENT — PAIN - FUNCTIONAL ASSESSMENT
PAIN_FUNCTIONAL_ASSESSMENT: PREVENTS OR INTERFERES SOME ACTIVE ACTIVITIES AND ADLS
PAIN_FUNCTIONAL_ASSESSMENT: 0-10
PAIN_FUNCTIONAL_ASSESSMENT: PREVENTS OR INTERFERES WITH MANY ACTIVE NOT PASSIVE ACTIVITIES
PAIN_FUNCTIONAL_ASSESSMENT: 0-10
PAIN_FUNCTIONAL_ASSESSMENT: PREVENTS OR INTERFERES WITH MANY ACTIVE NOT PASSIVE ACTIVITIES

## 2023-12-09 ASSESSMENT — PAIN DESCRIPTION - ORIENTATION
ORIENTATION: LEFT

## 2023-12-09 ASSESSMENT — PAIN DESCRIPTION - DESCRIPTORS
DESCRIPTORS: CRUSHING;GNAWING
DESCRIPTORS: ACHING

## 2023-12-09 NOTE — ED PROVIDER NOTES
Emergency Department Provider Note       PCP: Ashanti Mercado DO   Age: 66 y.o. Sex: female     Winston Romero Admitted 12/09/2023 02:19:29 PM       ICD-10-CM    1. Closed displaced comminuted fracture of shaft of left tibia, initial encounter  S82.252A       2. Closed fracture of proximal end of left fibula, unspecified fracture morphology, initial encounter  S82.832A           Medical Decision Making     Complexity of Problems Addressed:  Complexity of Problem: 1 acute complicated illness or injury. Data Reviewed and Analyzed:  I independently ordered and reviewed each unique test.     The patients assessment required an independent historian: EMS. The reason they were needed is important historical information not provided by the patient. I interpreted the X-rays. Comminuted displaced left distal tibia fracture and proximal fibular fracture    Discussion of management or test interpretation. Discussed with orthopedics. Splint placed. Will admit to hospitalist for pain management and plan for surgery. Risk of Complications and/or Morbidity of Patient Management:      History      56-year-old female slipped on the wet leaves outside while hunting and fell. She states her left foot turned inwards and she heard a crack. She reports severe pain in her left leg and ankle. Denies hitting her head. No loss of consciousness or other injuries. Given 200 mics of fentanyl by EMS. Physical Exam     Vitals signs and nursing note reviewed. Vitals:    12/09/23 1315 12/09/23 1325 12/09/23 1345 12/09/23 1355   BP: 114/75 113/73 109/69 120/77   Pulse:       Resp:       Temp:       TempSrc:       SpO2: 93% 93% 93% 98%   Weight:       Height:           Physical Exam  Vitals and nursing note reviewed. Constitutional:       Appearance: Normal appearance. She is well-developed. HENT:      Head: Normocephalic and atraumatic.       Nose: Nose normal.      Mouth/Throat:      Mouth: Mucous membranes

## 2023-12-09 NOTE — H&P
sulfate 2000 mg in 50 mL IVPB premix    enoxaparin (LOVENOX) injection 40 mg     Order Specific Question:   Indication of Use     Answer:   Prophylaxis-DVT/PE    OR Linked Order Group     ondansetron (ZOFRAN-ODT) disintegrating tablet 4 mg     ondansetron (ZOFRAN) injection 4 mg    polyethylene glycol (GLYCOLAX) packet 17 g    OR Linked Order Group     acetaminophen (TYLENOL) tablet 650 mg     acetaminophen (TYLENOL) suppository 650 mg    morphine injection 2 mg       I have personally reviewed labs and tests:  Recent Labs:  No results found for this or any previous visit (from the past 24 hour(s)). I have personally reviewed imaging studies:  XR TIBIA FIBULA LEFT (2 VIEWS)    Result Date: 12/9/2023  XR TIBIA FIBULA LEFT (2 VIEWS)  INDICATION: Age: 66 years. Gender: Female. :  Reason for exam:->fall, leg pain COMPARISON: None FINDINGS: There is acute spiral fracture of the mid to distal tibial shaft with mild posterior displacement. Acute nondisplaced fibular neck/proximal shaft fracture also noted. There is soft tissue swelling of the leg. 1. Acute spiral fracture of the mid to distal tibial shaft with mild posterior displacement. 2. Acute nondisplaced fracture of the fibular neck/proximal shaft. XR ANKLE LEFT (MIN 3 VIEWS)    Result Date: 12/9/2023  XR ANKLE LEFT (MIN 3 VIEWS)  INDICATION: Age: 66 years. Gender: Female. :  Reason for exam:->left ankle injury/pain COMPARISON: None FINDINGS: There is acute spiral fracture of the mid to distal tibial shaft with mild posterolateral displacement. There is soft tissue swelling of the leg and ankle. There is no fracture of the left ankle proper. Acute spiral fracture of the mid to distal tibia shaft with mild posterolateral displacement. Signed:  Kim Randle MD    Part of this note may have been written by using a voice dictation software. The note has been proof read but may still contain some grammatical/other typographical errors.

## 2023-12-09 NOTE — ED TRIAGE NOTES
Per ems called to scene for trip/fall. States fell this morning while hunting. Deformity/crepitus noted to left ankle. Splint noted. 200mcg fentanyl given in route. Denies head trauma denies loc.

## 2023-12-09 NOTE — ED NOTES
TRANSFER - OUT REPORT:    Verbal report given to Angelo RONDON on Amity Dylan  being transferred to [facility] for routine progression of patient care       Report consisted of patient's Situation, Background, Assessment and   Recommendations(SBAR). Information from the following report(s) Nurse Handoff Report and ED SBAR was reviewed with the receiving nurse. [place] Fall Assessment:    Presents to emergency department  because of falls (Syncope, seizure, or loss of consciousness): No  Age > 70: No  Altered Mental Status, Intoxication with alcohol or substance confusion (Disorientation, impaired judgment, poor safety awaremess, or inability to follow instructions): No  Impaired Mobility: Ambulates or transfers with assistive devices or assistance; Unable to ambulate or transer.: No  Nursing Judgement: No          Lines:   Peripheral IV 12/09/23 Left;Posterior Hand (Active)   Site Assessment Clean, dry & intact 12/09/23 1259   Line Status Blood return noted 12/09/23 1259       Peripheral IV 12/09/23 Left Antecubital (Active)   Site Assessment Clean, dry & intact 12/09/23 1703   Line Status Blood return noted 12/09/23 1703        Opportunity for questions and clarification was provided.       Patient transported with:  Ed Rayna Trejo RN  12/09/23 7537

## 2023-12-10 LAB
ANION GAP SERPL CALC-SCNC: 4 MMOL/L (ref 2–11)
BUN SERPL-MCNC: 13 MG/DL (ref 6–23)
CALCIUM SERPL-MCNC: 9.2 MG/DL (ref 8.3–10.4)
CHLORIDE SERPL-SCNC: 105 MMOL/L (ref 103–113)
CO2 SERPL-SCNC: 28 MMOL/L (ref 21–32)
CREAT SERPL-MCNC: 0.8 MG/DL (ref 0.6–1)
ERYTHROCYTE [DISTWIDTH] IN BLOOD BY AUTOMATED COUNT: 12.9 % (ref 11.9–14.6)
GLUCOSE SERPL-MCNC: 110 MG/DL (ref 65–100)
HCT VFR BLD AUTO: 40.1 % (ref 35.8–46.3)
HGB BLD-MCNC: 13.3 G/DL (ref 11.7–15.4)
MAGNESIUM SERPL-MCNC: 2.3 MG/DL (ref 1.8–2.4)
MCH RBC QN AUTO: 29.5 PG (ref 26.1–32.9)
MCHC RBC AUTO-ENTMCNC: 33.2 G/DL (ref 31.4–35)
MCV RBC AUTO: 88.9 FL (ref 82–102)
NRBC # BLD: 0 K/UL (ref 0–0.2)
PLATELET # BLD AUTO: 272 K/UL (ref 150–450)
PMV BLD AUTO: 10.4 FL (ref 9.4–12.3)
POTASSIUM SERPL-SCNC: 3.5 MMOL/L (ref 3.5–5.1)
RBC # BLD AUTO: 4.51 M/UL (ref 4.05–5.2)
SODIUM SERPL-SCNC: 137 MMOL/L (ref 136–146)
WBC # BLD AUTO: 5.7 K/UL (ref 4.3–11.1)

## 2023-12-10 PROCEDURE — 6370000000 HC RX 637 (ALT 250 FOR IP): Performed by: FAMILY MEDICINE

## 2023-12-10 PROCEDURE — 6370000000 HC RX 637 (ALT 250 FOR IP): Performed by: STUDENT IN AN ORGANIZED HEALTH CARE EDUCATION/TRAINING PROGRAM

## 2023-12-10 PROCEDURE — 6360000002 HC RX W HCPCS: Performed by: INTERNAL MEDICINE

## 2023-12-10 PROCEDURE — 36415 COLL VENOUS BLD VENIPUNCTURE: CPT

## 2023-12-10 PROCEDURE — 6360000002 HC RX W HCPCS: Performed by: FAMILY MEDICINE

## 2023-12-10 PROCEDURE — 80048 BASIC METABOLIC PNL TOTAL CA: CPT

## 2023-12-10 PROCEDURE — 2580000003 HC RX 258: Performed by: INTERNAL MEDICINE

## 2023-12-10 PROCEDURE — 85027 COMPLETE CBC AUTOMATED: CPT

## 2023-12-10 PROCEDURE — 2500000003 HC RX 250 WO HCPCS: Performed by: FAMILY MEDICINE

## 2023-12-10 PROCEDURE — 1100000000 HC RM PRIVATE

## 2023-12-10 PROCEDURE — 83735 ASSAY OF MAGNESIUM: CPT

## 2023-12-10 RX ORDER — HYDROMORPHONE HYDROCHLORIDE 1 MG/ML
0.25 INJECTION, SOLUTION INTRAMUSCULAR; INTRAVENOUS; SUBCUTANEOUS EVERY 4 HOURS PRN
Status: DISCONTINUED | OUTPATIENT
Start: 2023-12-10 | End: 2023-12-14 | Stop reason: HOSPADM

## 2023-12-10 RX ORDER — ACETAMINOPHEN 500 MG
1000 TABLET ORAL EVERY 8 HOURS SCHEDULED
Status: COMPLETED | OUTPATIENT
Start: 2023-12-10 | End: 2023-12-13

## 2023-12-10 RX ORDER — ACETAMINOPHEN 650 MG/1
650 SUPPOSITORY RECTAL EVERY 8 HOURS SCHEDULED
Status: COMPLETED | OUTPATIENT
Start: 2023-12-10 | End: 2023-12-13

## 2023-12-10 RX ORDER — HYDROMORPHONE HYDROCHLORIDE 1 MG/ML
0.5 INJECTION, SOLUTION INTRAMUSCULAR; INTRAVENOUS; SUBCUTANEOUS EVERY 4 HOURS PRN
Status: DISCONTINUED | OUTPATIENT
Start: 2023-12-10 | End: 2023-12-14 | Stop reason: HOSPADM

## 2023-12-10 RX ADMIN — OXYCODONE 10 MG: 5 TABLET ORAL at 05:10

## 2023-12-10 RX ADMIN — MORPHINE SULFATE 4 MG: 2 INJECTION, SOLUTION INTRAMUSCULAR; INTRAVENOUS at 02:30

## 2023-12-10 RX ADMIN — OXYCODONE 10 MG: 5 TABLET ORAL at 13:44

## 2023-12-10 RX ADMIN — HYDROMORPHONE HYDROCHLORIDE 0.5 MG: 1 INJECTION, SOLUTION INTRAMUSCULAR; INTRAVENOUS; SUBCUTANEOUS at 11:03

## 2023-12-10 RX ADMIN — ACETAMINOPHEN 1000 MG: 500 TABLET, FILM COATED ORAL at 21:16

## 2023-12-10 RX ADMIN — HYDROMORPHONE HYDROCHLORIDE 0.5 MG: 1 INJECTION, SOLUTION INTRAMUSCULAR; INTRAVENOUS; SUBCUTANEOUS at 06:01

## 2023-12-10 RX ADMIN — SODIUM CHLORIDE, PRESERVATIVE FREE 10 ML: 5 INJECTION INTRAVENOUS at 09:14

## 2023-12-10 RX ADMIN — SODIUM CHLORIDE, PRESERVATIVE FREE 10 ML: 5 INJECTION INTRAVENOUS at 21:22

## 2023-12-10 RX ADMIN — ENOXAPARIN SODIUM 40 MG: 100 INJECTION SUBCUTANEOUS at 09:09

## 2023-12-10 RX ADMIN — HYDROMORPHONE HYDROCHLORIDE 0.5 MG: 1 INJECTION, SOLUTION INTRAMUSCULAR; INTRAVENOUS; SUBCUTANEOUS at 22:27

## 2023-12-10 RX ADMIN — OXYCODONE 10 MG: 5 TABLET ORAL at 09:09

## 2023-12-10 RX ADMIN — ACETAMINOPHEN 1000 MG: 500 TABLET, FILM COATED ORAL at 14:30

## 2023-12-10 RX ADMIN — HYDROMORPHONE HYDROCHLORIDE 0.5 MG: 1 INJECTION, SOLUTION INTRAMUSCULAR; INTRAVENOUS; SUBCUTANEOUS at 15:11

## 2023-12-10 ASSESSMENT — PAIN DESCRIPTION - ORIENTATION
ORIENTATION: LEFT

## 2023-12-10 ASSESSMENT — PAIN DESCRIPTION - LOCATION
LOCATION: LEG

## 2023-12-10 ASSESSMENT — PAIN SCALES - GENERAL
PAINLEVEL_OUTOF10: 7
PAINLEVEL_OUTOF10: 5
PAINLEVEL_OUTOF10: 10
PAINLEVEL_OUTOF10: 0
PAINLEVEL_OUTOF10: 7
PAINLEVEL_OUTOF10: 5
PAINLEVEL_OUTOF10: 7
PAINLEVEL_OUTOF10: 6
PAINLEVEL_OUTOF10: 4
PAINLEVEL_OUTOF10: 8
PAINLEVEL_OUTOF10: 0
PAINLEVEL_OUTOF10: 10
PAINLEVEL_OUTOF10: 5
PAINLEVEL_OUTOF10: 10
PAINLEVEL_OUTOF10: 9
PAINLEVEL_OUTOF10: 0

## 2023-12-10 ASSESSMENT — PAIN DESCRIPTION - DESCRIPTORS
DESCRIPTORS: ACHING;CRUSHING

## 2023-12-10 ASSESSMENT — PAIN - FUNCTIONAL ASSESSMENT
PAIN_FUNCTIONAL_ASSESSMENT: PREVENTS OR INTERFERES WITH MANY ACTIVE NOT PASSIVE ACTIVITIES
PAIN_FUNCTIONAL_ASSESSMENT: PREVENTS OR INTERFERES SOME ACTIVE ACTIVITIES AND ADLS
PAIN_FUNCTIONAL_ASSESSMENT: PREVENTS OR INTERFERES WITH ALL ACTIVE AND SOME PASSIVE ACTIVITIES
PAIN_FUNCTIONAL_ASSESSMENT: PREVENTS OR INTERFERES WITH MANY ACTIVE NOT PASSIVE ACTIVITIES

## 2023-12-10 ASSESSMENT — PAIN DESCRIPTION - PAIN TYPE: TYPE: OTHER (COMMENT)

## 2023-12-10 NOTE — FLOWSHEET NOTE
12/09/23 2150   Treatment Team Notification   Reason for Communication Review case  (Pain Management)   Name of Team Member Notified Dr. Yadi Pearce Team Role Other (Comment)   Method of Communication   (Hospitalist)   Response See orders  (Added Oxycodone/Increased dose of Morphine)   Notification Time 2150

## 2023-12-10 NOTE — CARE COORDINATION
CM reviewed / screen patient medica chart for discharge planning. CM met with patient / patient mother / patient wife at bedside and discussed discharge plan and needs. Patient alert/orient x4. Patient verified demographic, no changes needed. Verified PCP  Param Otto) was last seen last month in office and insurance ([employer]). Patient states she lives in a one level home with wife and granddaughter with entry level into the home. States she's independent with her ADL's and iADL's and has no DME's in the home. Patient states she's independent with working and driving herself to appointments. Patient denies any hx with [facility] / rehab. Patient has no needs identified at this time. CM will continue to follow for any needs, concerns or questions that may arise. 12/10/23 1630   Service Assessment   Patient Orientation Alert and Oriented;Person;Place;Situation;Self   Cognition Alert   History Provided By Patient;Spouse;Medical Record; Child/Family   Primary Caregiver Self   Support Systems Spouse/Significant Other;Family Members   Patient's Healthcare Decision Maker is: Legal Next of Kin   PCP Verified by CM Yes   Last Visit to PCP Within last 3 months   Prior Functional Level Independent in ADLs/IADLs   Current Functional Level Independent in ADLs/IADLs   Can patient return to prior living arrangement Yes   Ability to make needs known: Good   Family able to assist with home care needs: Yes   Would you like for me to discuss the discharge plan with any other family members/significant others, and if so, who?  Yes   Financial Resources Other (Comment)  ([employer])   Community Resources None   Social/Functional History   Lives With Spouse   Type of [address] [place] [address] Dr One level   Home Access Level entry   [address]   Bathroom Toilet Standard   Bathroom Equipment Commode   Bathroom Accessibility Accessible   Home Equipment None   Receives Help From Family   ADL Assistance

## 2023-12-10 NOTE — FLOWSHEET NOTE
12/10/23 0855   Treatment Team Notification   Reason for Communication Review case  (Pain managment)   Name of Team Member Notified Dr. Adriane Conti Team Role Other (Comment)  (Hospitalist on Call)   Method of Communication Secure Message   Response See orders; Other (Comment)  (Switched morphine to dilaudid)   Notification Time 6839

## 2023-12-11 ENCOUNTER — ANESTHESIA EVENT (OUTPATIENT)
Dept: SURGERY | Age: 58
DRG: 493 | End: 2023-12-11
Payer: COMMERCIAL

## 2023-12-11 ENCOUNTER — ANESTHESIA (OUTPATIENT)
Dept: SURGERY | Age: 58
DRG: 493 | End: 2023-12-11
Payer: COMMERCIAL

## 2023-12-11 ENCOUNTER — APPOINTMENT (OUTPATIENT)
Dept: GENERAL RADIOLOGY | Age: 58
DRG: 493 | End: 2023-12-11
Payer: COMMERCIAL

## 2023-12-11 PROCEDURE — 6370000000 HC RX 637 (ALT 250 FOR IP): Performed by: FAMILY MEDICINE

## 2023-12-11 PROCEDURE — 6370000000 HC RX 637 (ALT 250 FOR IP): Performed by: STUDENT IN AN ORGANIZED HEALTH CARE EDUCATION/TRAINING PROGRAM

## 2023-12-11 PROCEDURE — 2580000003 HC RX 258: Performed by: ANESTHESIOLOGY

## 2023-12-11 PROCEDURE — C1769 GUIDE WIRE: HCPCS | Performed by: ORTHOPAEDIC SURGERY

## 2023-12-11 PROCEDURE — C1713 ANCHOR/SCREW BN/BN,TIS/BN: HCPCS | Performed by: ORTHOPAEDIC SURGERY

## 2023-12-11 PROCEDURE — 6360000002 HC RX W HCPCS: Performed by: NURSE ANESTHETIST, CERTIFIED REGISTERED

## 2023-12-11 PROCEDURE — 3600000014 HC SURGERY LEVEL 4 ADDTL 15MIN: Performed by: ORTHOPAEDIC SURGERY

## 2023-12-11 PROCEDURE — 2580000003 HC RX 258: Performed by: INTERNAL MEDICINE

## 2023-12-11 PROCEDURE — 6370000000 HC RX 637 (ALT 250 FOR IP): Performed by: ANESTHESIOLOGY

## 2023-12-11 PROCEDURE — 1100000000 HC RM PRIVATE

## 2023-12-11 PROCEDURE — 2500000003 HC RX 250 WO HCPCS: Performed by: FAMILY MEDICINE

## 2023-12-11 PROCEDURE — 3700000001 HC ADD 15 MINUTES (ANESTHESIA): Performed by: ORTHOPAEDIC SURGERY

## 2023-12-11 PROCEDURE — 2709999900 HC NON-CHARGEABLE SUPPLY: Performed by: ORTHOPAEDIC SURGERY

## 2023-12-11 PROCEDURE — 2720000010 HC SURG SUPPLY STERILE: Performed by: ORTHOPAEDIC SURGERY

## 2023-12-11 PROCEDURE — 3600000004 HC SURGERY LEVEL 4 BASE: Performed by: ORTHOPAEDIC SURGERY

## 2023-12-11 PROCEDURE — 7100000001 HC PACU RECOVERY - ADDTL 15 MIN: Performed by: ORTHOPAEDIC SURGERY

## 2023-12-11 PROCEDURE — 2500000003 HC RX 250 WO HCPCS: Performed by: NURSE ANESTHETIST, CERTIFIED REGISTERED

## 2023-12-11 PROCEDURE — 64447 NJX AA&/STRD FEMORAL NRV IMG: CPT | Performed by: ANESTHESIOLOGY

## 2023-12-11 PROCEDURE — 6360000002 HC RX W HCPCS: Performed by: ANESTHESIOLOGY

## 2023-12-11 PROCEDURE — 0QSH06Z REPOSITION LEFT TIBIA WITH INTRAMEDULLARY INTERNAL FIXATION DEVICE, OPEN APPROACH: ICD-10-PCS | Performed by: ORTHOPAEDIC SURGERY

## 2023-12-11 PROCEDURE — 6360000002 HC RX W HCPCS: Performed by: PHYSICIAN ASSISTANT

## 2023-12-11 PROCEDURE — 7100000000 HC PACU RECOVERY - FIRST 15 MIN: Performed by: ORTHOPAEDIC SURGERY

## 2023-12-11 PROCEDURE — 3700000000 HC ANESTHESIA ATTENDED CARE: Performed by: ORTHOPAEDIC SURGERY

## 2023-12-11 DEVICE — TIBIAL NAIL
Type: IMPLANTABLE DEVICE | Site: LEG | Status: FUNCTIONAL
Brand: T2 ALPHA

## 2023-12-11 DEVICE — ADVANCED LOCKING SCREW: Type: IMPLANTABLE DEVICE | Site: LEG | Status: FUNCTIONAL

## 2023-12-11 DEVICE — LOCKING SCREW
Type: IMPLANTABLE DEVICE | Site: LEG | Status: FUNCTIONAL
Brand: T2 ALPHA

## 2023-12-11 RX ORDER — HYDROMORPHONE HYDROCHLORIDE 2 MG/ML
0.5 INJECTION, SOLUTION INTRAMUSCULAR; INTRAVENOUS; SUBCUTANEOUS EVERY 5 MIN PRN
Status: DISCONTINUED | OUTPATIENT
Start: 2023-12-11 | End: 2023-12-11 | Stop reason: HOSPADM

## 2023-12-11 RX ORDER — SODIUM CHLORIDE 0.9 % (FLUSH) 0.9 %
5-40 SYRINGE (ML) INJECTION EVERY 12 HOURS SCHEDULED
Status: DISCONTINUED | OUTPATIENT
Start: 2023-12-11 | End: 2023-12-11 | Stop reason: HOSPADM

## 2023-12-11 RX ORDER — ONDANSETRON 2 MG/ML
INJECTION INTRAMUSCULAR; INTRAVENOUS PRN
Status: DISCONTINUED | OUTPATIENT
Start: 2023-12-11 | End: 2023-12-11

## 2023-12-11 RX ORDER — SODIUM CHLORIDE 0.9 % (FLUSH) 0.9 %
5-40 SYRINGE (ML) INJECTION PRN
Status: DISCONTINUED | OUTPATIENT
Start: 2023-12-11 | End: 2023-12-11 | Stop reason: HOSPADM

## 2023-12-11 RX ORDER — SODIUM CHLORIDE 9 MG/ML
INJECTION, SOLUTION INTRAVENOUS PRN
Status: DISCONTINUED | OUTPATIENT
Start: 2023-12-11 | End: 2023-12-11 | Stop reason: HOSPADM

## 2023-12-11 RX ORDER — ONDANSETRON 2 MG/ML
INJECTION INTRAMUSCULAR; INTRAVENOUS PRN
Status: DISCONTINUED | OUTPATIENT
Start: 2023-12-11 | End: 2023-12-11 | Stop reason: SDUPTHER

## 2023-12-11 RX ORDER — DEXAMETHASONE SODIUM PHOSPHATE 4 MG/ML
INJECTION, SOLUTION INTRA-ARTICULAR; INTRALESIONAL; INTRAMUSCULAR; INTRAVENOUS; SOFT TISSUE PRN
Status: DISCONTINUED | OUTPATIENT
Start: 2023-12-11 | End: 2023-12-11 | Stop reason: SDUPTHER

## 2023-12-11 RX ORDER — ONDANSETRON 2 MG/ML
4 INJECTION INTRAMUSCULAR; INTRAVENOUS
Status: DISCONTINUED | OUTPATIENT
Start: 2023-12-11 | End: 2023-12-11 | Stop reason: HOSPADM

## 2023-12-11 RX ORDER — LIDOCAINE HYDROCHLORIDE 20 MG/ML
INJECTION, SOLUTION EPIDURAL; INFILTRATION; INTRACAUDAL; PERINEURAL PRN
Status: DISCONTINUED | OUTPATIENT
Start: 2023-12-11 | End: 2023-12-11 | Stop reason: SDUPTHER

## 2023-12-11 RX ORDER — PROPOFOL 10 MG/ML
INJECTION, EMULSION INTRAVENOUS PRN
Status: DISCONTINUED | OUTPATIENT
Start: 2023-12-11 | End: 2023-12-11 | Stop reason: SDUPTHER

## 2023-12-11 RX ORDER — HYDROMORPHONE HYDROCHLORIDE 2 MG/ML
0.25 INJECTION, SOLUTION INTRAMUSCULAR; INTRAVENOUS; SUBCUTANEOUS EVERY 5 MIN PRN
Status: DISCONTINUED | OUTPATIENT
Start: 2023-12-11 | End: 2023-12-11 | Stop reason: HOSPADM

## 2023-12-11 RX ORDER — OXYCODONE HYDROCHLORIDE 5 MG/1
5 TABLET ORAL PRN
Status: COMPLETED | OUTPATIENT
Start: 2023-12-11 | End: 2023-12-11

## 2023-12-11 RX ORDER — SODIUM CHLORIDE, SODIUM LACTATE, POTASSIUM CHLORIDE, CALCIUM CHLORIDE 600; 310; 30; 20 MG/100ML; MG/100ML; MG/100ML; MG/100ML
INJECTION, SOLUTION INTRAVENOUS CONTINUOUS
Status: DISCONTINUED | OUTPATIENT
Start: 2023-12-11 | End: 2023-12-11 | Stop reason: HOSPADM

## 2023-12-11 RX ORDER — SODIUM CHLORIDE 9 MG/ML
INJECTION, SOLUTION INTRAVENOUS CONTINUOUS
Status: DISCONTINUED | OUTPATIENT
Start: 2023-12-11 | End: 2023-12-11 | Stop reason: HOSPADM

## 2023-12-11 RX ORDER — FENTANYL CITRATE 50 UG/ML
25 INJECTION, SOLUTION INTRAMUSCULAR; INTRAVENOUS
Status: COMPLETED | OUTPATIENT
Start: 2023-12-11 | End: 2023-12-11

## 2023-12-11 RX ORDER — FENTANYL CITRATE 50 UG/ML
100 INJECTION, SOLUTION INTRAMUSCULAR; INTRAVENOUS
Status: COMPLETED | OUTPATIENT
Start: 2023-12-11 | End: 2023-12-11

## 2023-12-11 RX ORDER — TRANEXAMIC ACID 100 MG/ML
INJECTION, SOLUTION INTRAVENOUS PRN
Status: DISCONTINUED | OUTPATIENT
Start: 2023-12-11 | End: 2023-12-11 | Stop reason: SDUPTHER

## 2023-12-11 RX ORDER — LIDOCAINE HYDROCHLORIDE 10 MG/ML
1 INJECTION, SOLUTION INFILTRATION; PERINEURAL
Status: DISCONTINUED | OUTPATIENT
Start: 2023-12-11 | End: 2023-12-11 | Stop reason: HOSPADM

## 2023-12-11 RX ORDER — OXYCODONE HYDROCHLORIDE 5 MG/1
10 TABLET ORAL PRN
Status: COMPLETED | OUTPATIENT
Start: 2023-12-11 | End: 2023-12-11

## 2023-12-11 RX ORDER — HYDROMORPHONE HYDROCHLORIDE 2 MG/ML
INJECTION, SOLUTION INTRAMUSCULAR; INTRAVENOUS; SUBCUTANEOUS PRN
Status: DISCONTINUED | OUTPATIENT
Start: 2023-12-11 | End: 2023-12-11 | Stop reason: SDUPTHER

## 2023-12-11 RX ORDER — MIDAZOLAM HYDROCHLORIDE 2 MG/2ML
2 INJECTION, SOLUTION INTRAMUSCULAR; INTRAVENOUS
Status: COMPLETED | OUTPATIENT
Start: 2023-12-11 | End: 2023-12-11

## 2023-12-11 RX ORDER — DIPHENHYDRAMINE HYDROCHLORIDE 50 MG/ML
6.25 INJECTION INTRAMUSCULAR; INTRAVENOUS
Status: DISCONTINUED | OUTPATIENT
Start: 2023-12-11 | End: 2023-12-11 | Stop reason: HOSPADM

## 2023-12-11 RX ADMIN — OXYCODONE 10 MG: 5 TABLET ORAL at 15:26

## 2023-12-11 RX ADMIN — OXYCODONE 10 MG: 5 TABLET ORAL at 08:36

## 2023-12-11 RX ADMIN — SODIUM CHLORIDE, SODIUM LACTATE, POTASSIUM CHLORIDE, AND CALCIUM CHLORIDE: 600; 310; 30; 20 INJECTION, SOLUTION INTRAVENOUS at 14:23

## 2023-12-11 RX ADMIN — PROPOFOL 200 MG: 10 INJECTION, EMULSION INTRAVENOUS at 13:09

## 2023-12-11 RX ADMIN — HYDROMORPHONE HYDROCHLORIDE 0.5 MG: 1 INJECTION, SOLUTION INTRAMUSCULAR; INTRAVENOUS; SUBCUTANEOUS at 04:14

## 2023-12-11 RX ADMIN — SODIUM CHLORIDE, PRESERVATIVE FREE 10 ML: 5 INJECTION INTRAVENOUS at 20:35

## 2023-12-11 RX ADMIN — ACETAMINOPHEN 1000 MG: 500 TABLET, FILM COATED ORAL at 20:35

## 2023-12-11 RX ADMIN — HYDROMORPHONE HYDROCHLORIDE 0.2 MG: 2 INJECTION INTRAMUSCULAR; INTRAVENOUS; SUBCUTANEOUS at 14:43

## 2023-12-11 RX ADMIN — HYDROMORPHONE HYDROCHLORIDE 0.5 MG: 1 INJECTION, SOLUTION INTRAMUSCULAR; INTRAVENOUS; SUBCUTANEOUS at 22:04

## 2023-12-11 RX ADMIN — ACETAMINOPHEN 1000 MG: 500 TABLET, FILM COATED ORAL at 06:22

## 2023-12-11 RX ADMIN — ONDANSETRON 4 MG: 2 INJECTION INTRAMUSCULAR; INTRAVENOUS at 13:16

## 2023-12-11 RX ADMIN — SODIUM CHLORIDE, SODIUM LACTATE, POTASSIUM CHLORIDE, AND CALCIUM CHLORIDE: 600; 310; 30; 20 INJECTION, SOLUTION INTRAVENOUS at 12:00

## 2023-12-11 RX ADMIN — SODIUM CHLORIDE, PRESERVATIVE FREE 10 ML: 5 INJECTION INTRAVENOUS at 08:42

## 2023-12-11 RX ADMIN — HYDROMORPHONE HYDROCHLORIDE 0.2 MG: 2 INJECTION INTRAMUSCULAR; INTRAVENOUS; SUBCUTANEOUS at 14:11

## 2023-12-11 RX ADMIN — MIDAZOLAM 2 MG: 1 INJECTION INTRAMUSCULAR; INTRAVENOUS at 12:32

## 2023-12-11 RX ADMIN — HYDROMORPHONE HYDROCHLORIDE 0.5 MG: 1 INJECTION, SOLUTION INTRAMUSCULAR; INTRAVENOUS; SUBCUTANEOUS at 17:57

## 2023-12-11 RX ADMIN — HYDROMORPHONE HYDROCHLORIDE 0.4 MG: 2 INJECTION INTRAMUSCULAR; INTRAVENOUS; SUBCUTANEOUS at 13:48

## 2023-12-11 RX ADMIN — TRANEXAMIC ACID 1000 MG: 100 INJECTION, SOLUTION INTRAVENOUS at 14:34

## 2023-12-11 RX ADMIN — HYDROMORPHONE HYDROCHLORIDE 0.2 MG: 2 INJECTION INTRAMUSCULAR; INTRAVENOUS; SUBCUTANEOUS at 14:50

## 2023-12-11 RX ADMIN — OXYCODONE 10 MG: 5 TABLET ORAL at 20:35

## 2023-12-11 RX ADMIN — Medication 2000 MG: at 13:16

## 2023-12-11 RX ADMIN — LIDOCAINE HYDROCHLORIDE 80 MG: 20 INJECTION, SOLUTION EPIDURAL; INFILTRATION; INTRACAUDAL; PERINEURAL at 13:09

## 2023-12-11 RX ADMIN — DEXAMETHASONE SODIUM PHOSPHATE 4 MG: 4 INJECTION, SOLUTION INTRAMUSCULAR; INTRAVENOUS at 13:16

## 2023-12-11 RX ADMIN — MEPIVACAINE HYDROCHLORIDE 20 ML: 15 INJECTION, SOLUTION EPIDURAL; INFILTRATION at 12:31

## 2023-12-11 RX ADMIN — ACETAMINOPHEN 1000 MG: 500 TABLET, FILM COATED ORAL at 15:26

## 2023-12-11 RX ADMIN — FENTANYL CITRATE 100 MCG: 50 INJECTION, SOLUTION INTRAMUSCULAR; INTRAVENOUS at 12:32

## 2023-12-11 ASSESSMENT — PAIN DESCRIPTION - LOCATION
LOCATION: KNEE
LOCATION: LEG

## 2023-12-11 ASSESSMENT — PAIN SCALES - GENERAL
PAINLEVEL_OUTOF10: 8
PAINLEVEL_OUTOF10: 7
PAINLEVEL_OUTOF10: 9
PAINLEVEL_OUTOF10: 9
PAINLEVEL_OUTOF10: 6
PAINLEVEL_OUTOF10: 0
PAINLEVEL_OUTOF10: 6
PAINLEVEL_OUTOF10: 10
PAINLEVEL_OUTOF10: 7
PAINLEVEL_OUTOF10: 8
PAINLEVEL_OUTOF10: 8

## 2023-12-11 ASSESSMENT — PAIN - FUNCTIONAL ASSESSMENT: PAIN_FUNCTIONAL_ASSESSMENT: 0-10

## 2023-12-11 ASSESSMENT — LIFESTYLE VARIABLES: SMOKING_STATUS: 1

## 2023-12-11 ASSESSMENT — PAIN DESCRIPTION - DESCRIPTORS
DESCRIPTORS: ACHING
DESCRIPTORS: ACHING

## 2023-12-11 ASSESSMENT — PAIN DESCRIPTION - ORIENTATION
ORIENTATION: LEFT

## 2023-12-11 NOTE — ANESTHESIA PROCEDURE NOTES
Peripheral Block    Patient location during procedure: pre-op  Reason for block: post-op pain management and at surgeon's request  Start time: 12/11/2023 12:31 PM  End time: 12/11/2023 12:34 PM  Staffing  Performed: anesthesiologist   Anesthesiologist: Simonne Pallas, MD  Performed by: Simonne Pallas, MD  Authorized by: Simonne Pallas, MD    Preanesthetic Checklist  Completed: patient identified, IV checked, site marked, risks and benefits discussed, surgical/procedural consents, equipment checked, pre-op evaluation, timeout performed, anesthesia consent given, oxygen available and monitors applied/VS acknowledged  Peripheral Block   Patient position: supine  Prep: ChloraPrep  Provider prep: mask and sterile gloves  Patient monitoring: cardiac monitor, continuous pulse ox, continuous capnometry, frequent blood pressure checks, IV access, oxygen and responsive to questions  Block type: Femoral  Adductor canal  Laterality: left  Injection technique: single-shot  Guidance: ultrasound guided  Local infiltration: ropivacaine  Infiltration strength: 0.2 %  Local infiltration: ropivacaine  Dose: 3 mL    Needle   Needle type: insulated echogenic nerve stimulator needle   Needle gauge: 20 G  Needle localization: ultrasound guidance  Needle insertion depth: 7 cm  Test dose: negative  Needle length: 10 cm  Assessment   Injection assessment: negative aspiration for heme, no paresthesia on injection, local visualized surrounding nerve on ultrasound and no intravascular symptoms  Paresthesia pain: none  Slow fractionated injection: yes  Hemodynamics: stable  Real-time US image taken/store: yes  Outcomes: uncomplicated    Additional Notes  A peripheral nerve block (PNB) was performed at the request of the operating surgeon to assist with postoperative pain control. The risks of PNB (including possible nerve and muscle injury), benefits, and alternative therapies were discussed with the patient.  The patient then consented

## 2023-12-11 NOTE — ANESTHESIA PROCEDURE NOTES
Airway  Date/Time: 12/11/2023 1:11 PM  Urgency: elective    Airway not difficult    General Information and Staff    Patient location during procedure: OR  Resident/CRNA: ANA Eisenberg CRNA  Performed: resident/CRNA   Performed by: ANA Eisenberg CRNA  Authorized by: ANA Eisenberg CRNA      Indications and Patient Condition  Indications for airway management: anesthesia  Spontaneous ventilation: present  Sedation level: deep  Preoxygenated: yes  Patient position: sniffing  MILS not maintained throughout  Mask difficulty assessment: vent by bag mask    Final Airway Details  Final airway type: supraglottic airway      Successful airway: oropharyngeal  Size 4     Number of attempts at approach: 1  Ventilation between attempts: supraglottic airway  Number of other approaches attempted: 0    no

## 2023-12-11 NOTE — OP NOTE
Operative Note    Scar Peterson  MRN: [id number]    Date Of Surgery: 12/11/2023    Surgeon: Carmencita Zheng MD    Assistant Surgeon: None    Procedure Performed:   left  Tibia shaft intramedullary nail    Pre Op Diagnosis:  Tibia shaft fracture    Post Op Diagnosis:   same    Implants:   Implant Name Type Inv. Item Serial No.  Lot No. LRB No. Used Action   TIBIAL NAIL [id number] - [id number]  TIBIAL NAIL [id number]  [employer] [id number] Left 1 Implanted   SCREW LK 5X40MM - [id number]  SCREW LK 5X40MM  [employer] ten[employer]-WD [id number] Left 1 Implanted   SCREW BNE LCK 5X35 MM ADV STRL - [id number]  SCREW BNE LCK 5X35 MM ADV STRL  [employer] tenKsolar-WD [id number] Left 1 Implanted   SCREW LK 5.0X37MM - [id number]  SCREW LK 5.0X37MM  [employer] [id number] Left 1 Implanted   LOCKING SCREW - [id number]  LOCKING SCREW  [employer] [id number] Left 1 Implanted       Anesthesia:  General    Blood Loss:  50 cc    Tourniquet:      Pre Operative Abx:   Ancef 2g    Specimens/Cultures:  -    Significant Findings:  Distal third oblique rotational tibial fracture    Pre Operative Course:  Mirtha Dasilva is a 66 y.o. female who was helping deer stand this past weekend when she slipped fell on send fractured her leg. She presented to [facility] ER the diagnosis of tibial shaft fracture was made. Orthopedics was consulted and discussed with the patient decision was made for surgery. Operation In Detail:  Patient was evaluated in the preoperative area. The left lower extremity was marked by me. We had a long discussion about the procedure and postoperative protocols. The patient was then brought back to the operating room suite and placed in the operating room table. A timeout was taken to identify the patient, procedure being performed, and laterality.   After this the patient was prepped and draped in the normal sterile fashion using a Betadine solution and/or a

## 2023-12-11 NOTE — ANESTHESIA POSTPROCEDURE EVALUATION
Department of Anesthesiology  Postprocedure Note    Patient: Miguel Mckeon  MRN: [id number]  YOB: 1965  Date of evaluation: 12/11/2023      Procedure Summary       Date: 12/11/23 Room / Location: D OP OR 07 / [facility]    Anesthesia Start: 1304 Anesthesia Stop: 1507    Procedure: SURGICAL FIXATION OF LEFT TIBIAL SHAFT FRACTURE (Left: Leg Lower) Diagnosis:       Fracture of tibial shaft, left, closed      (Fracture of tibial shaft, left, closed [S82.202A])    Surgeons: Sami Black MD Responsible Provider: Zane Mejía MD    Anesthesia Type: General ASA Status: 2            Anesthesia Type: General    Lawrence Phase I: Lawrence Score: 10    Lawrence Phase II:        Anesthesia Post Evaluation    Patient location during evaluation: PACU  Patient participation: complete - patient participated  Level of consciousness: awake  Airway patency: patent  Nausea & Vomiting: no nausea  Complications: no  Cardiovascular status: hemodynamically stable  Respiratory status: acceptable and nonlabored ventilation  Hydration status: stable  Multimodal analgesia pain management approach

## 2023-12-12 LAB
ALBUMIN SERPL-MCNC: 3.2 G/DL (ref 3.5–5)
ANION GAP SERPL CALC-SCNC: 2 MMOL/L (ref 2–11)
BASOPHILS # BLD: 0 K/UL (ref 0–0.2)
BASOPHILS NFR BLD: 0 % (ref 0–2)
BUN SERPL-MCNC: 7 MG/DL (ref 6–23)
CALCIUM SERPL-MCNC: 8.6 MG/DL (ref 8.3–10.4)
CHLORIDE SERPL-SCNC: 103 MMOL/L (ref 103–113)
CO2 SERPL-SCNC: 33 MMOL/L (ref 21–32)
CREAT SERPL-MCNC: 0.6 MG/DL (ref 0.6–1)
DIFFERENTIAL METHOD BLD: ABNORMAL
EOSINOPHIL # BLD: 0 K/UL (ref 0–0.8)
EOSINOPHIL NFR BLD: 0 % (ref 0.5–7.8)
ERYTHROCYTE [DISTWIDTH] IN BLOOD BY AUTOMATED COUNT: 12.6 % (ref 11.9–14.6)
GLUCOSE SERPL-MCNC: 108 MG/DL (ref 65–100)
HCT VFR BLD AUTO: 34.1 % (ref 35.8–46.3)
HGB BLD-MCNC: 11.3 G/DL (ref 11.7–15.4)
IMM GRANULOCYTES # BLD AUTO: 0 K/UL (ref 0–0.5)
IMM GRANULOCYTES NFR BLD AUTO: 0 % (ref 0–5)
LYMPHOCYTES # BLD: 1.2 K/UL (ref 0.5–4.6)
LYMPHOCYTES NFR BLD: 18 % (ref 13–44)
MCH RBC QN AUTO: 29.8 PG (ref 26.1–32.9)
MCHC RBC AUTO-ENTMCNC: 33.1 G/DL (ref 31.4–35)
MCV RBC AUTO: 90 FL (ref 82–102)
MONOCYTES # BLD: 0.5 K/UL (ref 0.1–1.3)
MONOCYTES NFR BLD: 7 % (ref 4–12)
NEUTS SEG # BLD: 5.1 K/UL (ref 1.7–8.2)
NEUTS SEG NFR BLD: 75 % (ref 43–78)
NRBC # BLD: 0 K/UL (ref 0–0.2)
PHOSPHATE SERPL-MCNC: 3.4 MG/DL (ref 2.5–4.5)
PLATELET # BLD AUTO: 238 K/UL (ref 150–450)
PMV BLD AUTO: 10.8 FL (ref 9.4–12.3)
POTASSIUM SERPL-SCNC: 3.8 MMOL/L (ref 3.5–5.1)
RBC # BLD AUTO: 3.79 M/UL (ref 4.05–5.2)
SODIUM SERPL-SCNC: 138 MMOL/L (ref 136–146)
WBC # BLD AUTO: 6.9 K/UL (ref 4.3–11.1)

## 2023-12-12 PROCEDURE — 2500000003 HC RX 250 WO HCPCS: Performed by: FAMILY MEDICINE

## 2023-12-12 PROCEDURE — 1100000000 HC RM PRIVATE

## 2023-12-12 PROCEDURE — 97166 OT EVAL MOD COMPLEX 45 MIN: CPT

## 2023-12-12 PROCEDURE — 97112 NEUROMUSCULAR REEDUCATION: CPT

## 2023-12-12 PROCEDURE — 6370000000 HC RX 637 (ALT 250 FOR IP): Performed by: STUDENT IN AN ORGANIZED HEALTH CARE EDUCATION/TRAINING PROGRAM

## 2023-12-12 PROCEDURE — 97161 PT EVAL LOW COMPLEX 20 MIN: CPT

## 2023-12-12 PROCEDURE — 85025 COMPLETE CBC W/AUTO DIFF WBC: CPT

## 2023-12-12 PROCEDURE — 36415 COLL VENOUS BLD VENIPUNCTURE: CPT

## 2023-12-12 PROCEDURE — 2580000003 HC RX 258: Performed by: INTERNAL MEDICINE

## 2023-12-12 PROCEDURE — 2500000003 HC RX 250 WO HCPCS: Performed by: STUDENT IN AN ORGANIZED HEALTH CARE EDUCATION/TRAINING PROGRAM

## 2023-12-12 PROCEDURE — 6370000000 HC RX 637 (ALT 250 FOR IP): Performed by: FAMILY MEDICINE

## 2023-12-12 PROCEDURE — 97530 THERAPEUTIC ACTIVITIES: CPT

## 2023-12-12 PROCEDURE — 80069 RENAL FUNCTION PANEL: CPT

## 2023-12-12 PROCEDURE — 97535 SELF CARE MNGMENT TRAINING: CPT

## 2023-12-12 RX ORDER — ASPIRIN 81 MG/1
81 TABLET, CHEWABLE ORAL 2 TIMES DAILY
Status: DISCONTINUED | OUTPATIENT
Start: 2023-12-12 | End: 2023-12-14 | Stop reason: HOSPADM

## 2023-12-12 RX ORDER — OXYCODONE HYDROCHLORIDE 5 MG/1
10 TABLET ORAL EVERY 4 HOURS PRN
Status: DISCONTINUED | OUTPATIENT
Start: 2023-12-12 | End: 2023-12-14 | Stop reason: HOSPADM

## 2023-12-12 RX ORDER — HYDROMORPHONE HYDROCHLORIDE 1 MG/ML
1 INJECTION, SOLUTION INTRAMUSCULAR; INTRAVENOUS; SUBCUTANEOUS ONCE
Status: COMPLETED | OUTPATIENT
Start: 2023-12-12 | End: 2023-12-12

## 2023-12-12 RX ORDER — TRAMADOL HYDROCHLORIDE 50 MG/1
50 TABLET ORAL EVERY 6 HOURS PRN
Status: DISCONTINUED | OUTPATIENT
Start: 2023-12-12 | End: 2023-12-14 | Stop reason: HOSPADM

## 2023-12-12 RX ADMIN — SODIUM CHLORIDE, PRESERVATIVE FREE 10 ML: 5 INJECTION INTRAVENOUS at 20:54

## 2023-12-12 RX ADMIN — HYDROMORPHONE HYDROCHLORIDE 0.5 MG: 1 INJECTION, SOLUTION INTRAMUSCULAR; INTRAVENOUS; SUBCUTANEOUS at 02:34

## 2023-12-12 RX ADMIN — ASPIRIN 81 MG: 81 TABLET, CHEWABLE ORAL at 09:55

## 2023-12-12 RX ADMIN — OXYCODONE 10 MG: 5 TABLET ORAL at 05:35

## 2023-12-12 RX ADMIN — SODIUM CHLORIDE, PRESERVATIVE FREE 10 ML: 5 INJECTION INTRAVENOUS at 09:56

## 2023-12-12 RX ADMIN — OXYCODONE 10 MG: 5 TABLET ORAL at 09:55

## 2023-12-12 RX ADMIN — ACETAMINOPHEN 1000 MG: 500 TABLET, FILM COATED ORAL at 05:35

## 2023-12-12 RX ADMIN — ASPIRIN 81 MG: 81 TABLET, CHEWABLE ORAL at 20:54

## 2023-12-12 RX ADMIN — ACETAMINOPHEN 1000 MG: 500 TABLET, FILM COATED ORAL at 15:17

## 2023-12-12 RX ADMIN — HYDROMORPHONE HYDROCHLORIDE 1 MG: 1 INJECTION, SOLUTION INTRAMUSCULAR; INTRAVENOUS; SUBCUTANEOUS at 16:29

## 2023-12-12 RX ADMIN — OXYCODONE 10 MG: 5 TABLET ORAL at 01:07

## 2023-12-12 RX ADMIN — ACETAMINOPHEN 1000 MG: 500 TABLET, FILM COATED ORAL at 20:54

## 2023-12-12 RX ADMIN — HYDROMORPHONE HYDROCHLORIDE 0.5 MG: 1 INJECTION, SOLUTION INTRAMUSCULAR; INTRAVENOUS; SUBCUTANEOUS at 15:18

## 2023-12-12 RX ADMIN — OXYCODONE 10 MG: 5 TABLET ORAL at 14:00

## 2023-12-12 ASSESSMENT — PAIN DESCRIPTION - DESCRIPTORS
DESCRIPTORS: ACHING
DESCRIPTORS: ACHING;SHOOTING;SHARP;STABBING;THROBBING
DESCRIPTORS: ACHING;SHOOTING;STABBING
DESCRIPTORS: ACHING;SHARP;SHOOTING;STABBING;THROBBING
DESCRIPTORS: ACHING;SHOOTING;STABBING;THROBBING
DESCRIPTORS: ACHING;SHOOTING;STABBING
DESCRIPTORS: ACHING
DESCRIPTORS: ACHING;SHOOTING;STABBING
DESCRIPTORS: ACHING
DESCRIPTORS: ACHING
DESCRIPTORS: ACHING;SORE;THROBBING;STABBING
DESCRIPTORS: ACHING;STABBING;SHOOTING;THROBBING

## 2023-12-12 ASSESSMENT — PAIN SCALES - GENERAL
PAINLEVEL_OUTOF10: 10
PAINLEVEL_OUTOF10: 10
PAINLEVEL_OUTOF10: 8
PAINLEVEL_OUTOF10: 7
PAINLEVEL_OUTOF10: 8
PAINLEVEL_OUTOF10: 8
PAINLEVEL_OUTOF10: 3
PAINLEVEL_OUTOF10: 7
PAINLEVEL_OUTOF10: 8
PAINLEVEL_OUTOF10: 10
PAINLEVEL_OUTOF10: 2
PAINLEVEL_OUTOF10: 8
PAINLEVEL_OUTOF10: 2
PAINLEVEL_OUTOF10: 7

## 2023-12-12 ASSESSMENT — PAIN DESCRIPTION - LOCATION
LOCATION: LEG

## 2023-12-12 ASSESSMENT — PAIN DESCRIPTION - ORIENTATION
ORIENTATION: LEFT

## 2023-12-13 LAB
GLUCOSE BLD STRIP.AUTO-MCNC: 104 MG/DL (ref 65–100)
SERVICE CMNT-IMP: ABNORMAL

## 2023-12-13 PROCEDURE — 6370000000 HC RX 637 (ALT 250 FOR IP): Performed by: STUDENT IN AN ORGANIZED HEALTH CARE EDUCATION/TRAINING PROGRAM

## 2023-12-13 PROCEDURE — 2580000003 HC RX 258: Performed by: INTERNAL MEDICINE

## 2023-12-13 PROCEDURE — 82962 GLUCOSE BLOOD TEST: CPT

## 2023-12-13 PROCEDURE — 97535 SELF CARE MNGMENT TRAINING: CPT

## 2023-12-13 PROCEDURE — 97530 THERAPEUTIC ACTIVITIES: CPT

## 2023-12-13 PROCEDURE — 1100000000 HC RM PRIVATE

## 2023-12-13 RX ADMIN — ASPIRIN 81 MG: 81 TABLET, CHEWABLE ORAL at 09:08

## 2023-12-13 RX ADMIN — ASPIRIN 81 MG: 81 TABLET, CHEWABLE ORAL at 21:27

## 2023-12-13 RX ADMIN — OXYCODONE HYDROCHLORIDE 10 MG: 5 TABLET ORAL at 21:27

## 2023-12-13 RX ADMIN — ACETAMINOPHEN 1000 MG: 500 TABLET, FILM COATED ORAL at 05:53

## 2023-12-13 RX ADMIN — TRAMADOL HYDROCHLORIDE 50 MG: 50 TABLET ORAL at 00:28

## 2023-12-13 RX ADMIN — OXYCODONE HYDROCHLORIDE 10 MG: 5 TABLET ORAL at 17:01

## 2023-12-13 RX ADMIN — SODIUM CHLORIDE, PRESERVATIVE FREE 10 ML: 5 INJECTION INTRAVENOUS at 22:42

## 2023-12-13 RX ADMIN — OXYCODONE HYDROCHLORIDE 10 MG: 5 TABLET ORAL at 04:25

## 2023-12-13 RX ADMIN — TRAMADOL HYDROCHLORIDE 50 MG: 50 TABLET ORAL at 10:41

## 2023-12-13 RX ADMIN — SODIUM CHLORIDE, PRESERVATIVE FREE 10 ML: 5 INJECTION INTRAVENOUS at 09:09

## 2023-12-13 ASSESSMENT — PAIN DESCRIPTION - ORIENTATION
ORIENTATION: LEFT

## 2023-12-13 ASSESSMENT — PAIN SCALES - GENERAL
PAINLEVEL_OUTOF10: 5
PAINLEVEL_OUTOF10: 2
PAINLEVEL_OUTOF10: 3
PAINLEVEL_OUTOF10: 7
PAINLEVEL_OUTOF10: 10
PAINLEVEL_OUTOF10: 4
PAINLEVEL_OUTOF10: 6
PAINLEVEL_OUTOF10: 2
PAINLEVEL_OUTOF10: 4
PAINLEVEL_OUTOF10: 7
PAINLEVEL_OUTOF10: 6
PAINLEVEL_OUTOF10: 5
PAINLEVEL_OUTOF10: 5

## 2023-12-13 ASSESSMENT — PAIN DESCRIPTION - LOCATION
LOCATION: LEG

## 2023-12-13 ASSESSMENT — PAIN DESCRIPTION - DESCRIPTORS
DESCRIPTORS: ACHING
DESCRIPTORS: ACHING
DESCRIPTORS: ACHING;CRUSHING
DESCRIPTORS: ACHING

## 2023-12-13 ASSESSMENT — PAIN - FUNCTIONAL ASSESSMENT
PAIN_FUNCTIONAL_ASSESSMENT: PREVENTS OR INTERFERES WITH MANY ACTIVE NOT PASSIVE ACTIVITIES
PAIN_FUNCTIONAL_ASSESSMENT: PREVENTS OR INTERFERES SOME ACTIVE ACTIVITIES AND ADLS

## 2023-12-14 ENCOUNTER — HOME HEALTH ADMISSION (OUTPATIENT)
Dept: HOME HEALTH SERVICES | Facility: HOME HEALTH | Age: 58
End: 2023-12-14

## 2023-12-14 ENCOUNTER — TELEPHONE (OUTPATIENT)
Dept: ORTHOPEDIC SURGERY | Age: 58
End: 2023-12-14

## 2023-12-14 VITALS
WEIGHT: 186.8 LBS | HEART RATE: 71 BPM | OXYGEN SATURATION: 95 % | HEIGHT: 66 IN | TEMPERATURE: 99 F | SYSTOLIC BLOOD PRESSURE: 122 MMHG | DIASTOLIC BLOOD PRESSURE: 81 MMHG | RESPIRATION RATE: 18 BRPM | BODY MASS INDEX: 30.02 KG/M2

## 2023-12-14 PROBLEM — G25.81 RESTLESS LEG SYNDROME: Status: ACTIVE | Noted: 2023-12-14

## 2023-12-14 PROCEDURE — 2580000003 HC RX 258: Performed by: INTERNAL MEDICINE

## 2023-12-14 PROCEDURE — 6370000000 HC RX 637 (ALT 250 FOR IP): Performed by: STUDENT IN AN ORGANIZED HEALTH CARE EDUCATION/TRAINING PROGRAM

## 2023-12-14 PROCEDURE — 97530 THERAPEUTIC ACTIVITIES: CPT

## 2023-12-14 RX ORDER — OXYCODONE HYDROCHLORIDE 10 MG/1
10 TABLET ORAL EVERY 6 HOURS PRN
Qty: 12 TABLET | Refills: 0 | Status: SHIPPED | OUTPATIENT
Start: 2023-12-14 | End: 2023-12-17

## 2023-12-14 RX ORDER — NALOXONE HYDROCHLORIDE 4 MG/.1ML
1 SPRAY NASAL PRN
Qty: 1 EACH | Refills: 0 | Status: SHIPPED | OUTPATIENT
Start: 2023-12-14

## 2023-12-14 RX ORDER — ASPIRIN 81 MG/1
81 TABLET, CHEWABLE ORAL 2 TIMES DAILY
Qty: 42 TABLET | Refills: 0 | Status: SHIPPED | OUTPATIENT
Start: 2023-12-14 | End: 2024-01-04

## 2023-12-14 RX ORDER — TRAMADOL HYDROCHLORIDE 50 MG/1
50 TABLET ORAL EVERY 6 HOURS PRN
Qty: 12 TABLET | Refills: 0 | Status: SHIPPED | OUTPATIENT
Start: 2023-12-14 | End: 2023-12-17

## 2023-12-14 RX ADMIN — OXYCODONE HYDROCHLORIDE 10 MG: 5 TABLET ORAL at 04:36

## 2023-12-14 RX ADMIN — OXYCODONE HYDROCHLORIDE 10 MG: 5 TABLET ORAL at 08:24

## 2023-12-14 RX ADMIN — ASPIRIN 81 MG: 81 TABLET, CHEWABLE ORAL at 08:14

## 2023-12-14 RX ADMIN — TRAMADOL HYDROCHLORIDE 50 MG: 50 TABLET ORAL at 11:42

## 2023-12-14 RX ADMIN — SODIUM CHLORIDE, PRESERVATIVE FREE 10 ML: 5 INJECTION INTRAVENOUS at 09:00

## 2023-12-14 ASSESSMENT — PAIN DESCRIPTION - DESCRIPTORS
DESCRIPTORS: ACHING
DESCRIPTORS: ACHING
DESCRIPTORS: ACHING;SORE

## 2023-12-14 ASSESSMENT — PAIN DESCRIPTION - ORIENTATION
ORIENTATION: LEFT

## 2023-12-14 ASSESSMENT — PAIN - FUNCTIONAL ASSESSMENT: PAIN_FUNCTIONAL_ASSESSMENT: PREVENTS OR INTERFERES SOME ACTIVE ACTIVITIES AND ADLS

## 2023-12-14 ASSESSMENT — PAIN SCALES - GENERAL
PAINLEVEL_OUTOF10: 4
PAINLEVEL_OUTOF10: 7
PAINLEVEL_OUTOF10: 7
PAINLEVEL_OUTOF10: 5
PAINLEVEL_OUTOF10: 4
PAINLEVEL_OUTOF10: 4

## 2023-12-14 ASSESSMENT — PAIN DESCRIPTION - LOCATION
LOCATION: LEG

## 2023-12-14 NOTE — TELEPHONE ENCOUNTER
Call Alli bhatti/ JENY [phone] needs post op instructions on drsg chg'es and if can use ice on leg, is d/c'ing this am

## 2023-12-14 NOTE — DISCHARGE SUMMARY
CONSULT TO ORTHOPEDIC SURGERY  IP CONSULT TO PHYSICAL THERAPY  IP CONSULT TO OCCUPATIONAL THERAPY  IP CONSULT HOME HEALTH    Echocardiogram results:  No results found for this or any previous visit. Diagnostic Imaging/Tests:   XR TIBIA FIBULA LEFT (2 VIEWS)    Result Date: 12/9/2023  1. Acute spiral fracture of the mid to distal tibial shaft with mild posterior displacement. 2. Acute nondisplaced fracture of the fibular neck/proximal shaft. XR ANKLE LEFT (MIN 3 VIEWS)    Result Date: 12/9/2023  Acute spiral fracture of the mid to distal tibia shaft with mild posterolateral displacement. Labs: Results:       BMP, Mg, Phos Recent Labs     12/12/23  0452      K 3.8      CO2 33*   ANIONGAP 2   BUN 7   CREATININE 0.60   LABGLOM >60   CALCIUM 8.6   GLUCOSE 108*   PHOS 3.4      CBC Recent Labs     12/12/23  0452   WBC 6.9   RBC 3.79*   HGB 11.3*   HCT 34.1*   MCV 90.0   MCH 29.8   MCHC 33.1   RDW 12.6      MPV 10.8   NRBC 0.00   LYMPHOPCT 18   EOSRELPCT 0*   MONOPCT 7   BASOPCT 0   IMMGRAN 0   LYMPHSABS 1.2   EOSABS 0.0   MONOSABS 0.5   BASOSABS 0.0   ABSIMMGRAN 0.0      LFT Recent Labs     12/12/23  0452   LABALBU 3.2*      Cardiac  No results found for: \"NTPROBNP\", \"TROPHS\"   Coags No results found for: \"PROTIME\", \"INR\", \"APTT\"   A1c No results found for: \"LABA1C\", \"EAG\"   Lipids Lab Results   Component Value Date/Time    CHOL 183 10/15/2021 11:29 AM    LDLCALC 100 10/15/2021 11:29 AM    LABVLDL 10 08/09/2019 08:23 AM    HDL 68 10/15/2021 11:29 AM    TRIG 85 10/15/2021 11:29 AM      Thyroid  No results found for: \"TSHELE\", \"WPP2RCM\"     Most Recent UA No results found for: \"COLORU\", \"APPEARANCE\", \"SPECGRAV\", \"LABPH\", \"PROTEINU\", \"GLUCOSEU\", \"KETUA\", \"BILIRUBINUR\", \"BLOODU\", \"UROBILINOGEN\", \"NITRU\", \"LEUKOCYTESUR\", \"WBCUA\", \"RBCUA\", \"EPITHUA\", \"BACTERIA\", \"LABCAST\", \"MUCUS\"     Microbiology:  Results       ** No results found for the last 336 hours.  **            All Labs from Last 24

## 2023-12-14 NOTE — DISCHARGE INSTRUCTIONS
Post operative discharge orders as per Orthopedic recommendations- . Advised to follow up in 1 week with PCP. 2 weeks f/u with . Advised to keep at least 4- 6 hr gap between trazadone and pain medications. Advised not to take pain medications when drowsy or sleepy. Any new discharge/soaking of the dressing or any new fever or new significant swelling with pain left lower extremity or any sudden chest pain/ shortness of breath- advised to call PCP/Orthopedic or come back to ER. WB status - Toe Down Weight Bearing  DVT px - ASA 81 mg BID x 3 weeks    Instructions from your Surgeon: Keep dressing on until your follow-up appointment. You may loosen ace wrap but do not remove completely. May apply ice to leg as needed. Please call [address] [place] [address] at [phone] with any questions regarding your post-op care.

## 2023-12-14 NOTE — CARE COORDINATION
MSN, CM:  patient to be discharged home today with [facility]. Patient also received crutches during this admission. Patient and family agree with this discharge plan. Patient has met all milestones for this admission. Family to transport patient home. 12/14/23 1045   Service Assessment   Patient Orientation Alert and [id number] [address] Swenson At/After Discharge   Transition of Care Consult (CM Consult) [facility] Discharge PT;Nursing services   Mode of Transport at Discharge Other (see comment)  (family to transport)   Confirm Follow Up Transport Family   Condition of Participation: Discharge Planning   The Plan for Transition of Care is related to the following treatment goals: Home Health   The Patient and/or Patient Representative was provided with a Choice of Provider? Patient;Patient Representative   Name of the Patient Representative who was provided with the Choice of Provider and agrees with the Discharge Plan?  family   The Patient and/Or Patient Representative agree with the Discharge Plan? Yes   Freedom of Choice list was provided with basic dialogue that supports the patient's individualized plan of care/goals, treatment preferences, and shares the quality data associated with the providers?   Yes

## 2023-12-15 ENCOUNTER — TELEPHONE (OUTPATIENT)
Dept: ORTHOPEDIC SURGERY | Age: 58
End: 2023-12-15

## 2023-12-15 ENCOUNTER — TELEPHONE (OUTPATIENT)
Dept: INTERNAL MEDICINE CLINIC | Facility: CLINIC | Age: 58
End: 2023-12-15

## 2023-12-15 NOTE — TELEPHONE ENCOUNTER
Care Transitions Initial Follow Up Call    Outreach made within 2 business days of discharge: Yes    Patient: Leah Warren Patient : 1965   MRN: [id number]  Reason for Admission: There are no discharge diagnoses documented for the most recent discharge. Discharge Date: 23       Spoke with: Patient    Discharge department/facility: [facility]    TCM Interactive Patient Contact:  Was patient able to fill all prescriptions: Yes  Was patient instructed to bring all medications to the follow-up visit: Yes  Is patient taking all medications as directed in the discharge summary?  Yes  Does patient understand their discharge instructions: Yes  Does patient have questions or concerns that need addressed prior to 7-14 day follow up office visit: yes -     Scheduled appointment with PCP within 7-14 days    Follow Up  Future Appointments   Date Time Provider [address] [place] [address]   2023  3:30 PM Jayden Trejo MD [facility] AMB   2023  9:20 AM Calixto Finney MD [facility] AMB   3/29/2024  9:00 AM Durwood Oppenheim C, DO [facility] AMB       Brandy Fernandez LPN

## 2023-12-28 ENCOUNTER — TELEPHONE (OUTPATIENT)
Dept: ORTHOPEDIC SURGERY | Age: 58
End: 2023-12-28

## 2023-12-28 ENCOUNTER — OFFICE VISIT (OUTPATIENT)
Dept: ORTHOPEDIC SURGERY | Age: 58
End: 2023-12-28

## 2023-12-28 DIAGNOSIS — G89.18 POST-OP PAIN: Primary | ICD-10-CM

## 2023-12-28 DIAGNOSIS — S82.292D OTHER CLOSED FRACTURE OF SHAFT OF LEFT TIBIA WITH ROUTINE HEALING, SUBSEQUENT ENCOUNTER: ICD-10-CM

## 2023-12-28 PROCEDURE — 99024 POSTOP FOLLOW-UP VISIT: CPT | Performed by: ORTHOPAEDIC SURGERY

## 2023-12-28 RX ORDER — OXYCODONE HYDROCHLORIDE 5 MG/1
5 TABLET ORAL EVERY 4 HOURS PRN
Qty: 42 TABLET | Refills: 0 | Status: SHIPPED | OUTPATIENT
Start: 2023-12-28 | End: 2024-01-04

## 2023-12-28 NOTE — PROGRESS NOTES
Name: Mickeal Ahumada  YOB: 1965  Gender: female  MRN: [id number]    Plan:    DVT px: No VTE Prophylaxis Needed    Weight-bearing status: WBAT       Begin PT      Return to work/work restrictions: No work until next follow up      Follow up in 4 week(s)with XR          Procedure: Surgical Fixation Of Left Tibial Shaft Fracture - Left    Surgery Date: 12/11/2023      Subjective: Denies fevers chills or infection. Denies any signs of spreading erythema. Doing well. Denies any significant pain. Notes a clicking/popping on the lateral aspect of the ankle. ROS: Patient Denies fever/chills, headache, visual changes, chest pain, shortness of breath, and nausea/vomiting/diarrhea     Exam:     Wounds: appears to be healing well with good reapproximation, healing well , sutures in place and were removed without difficulty    Vascular: BLE: 2+ DP pulse, toes wwp w/ BCR<2s    Imaging:   Interpretation of imaging and   XR left tibia 2 view (AP/Lat) for lower leg pain   Findings: Stable fixation of tibial shaft fracture. Mildly displaced proximal fibula fracture noted. Impression: Stable surgical fixation of tibial shaft.   Proximal fibula fracture   Signature: Aditya Klein MD

## 2023-12-28 NOTE — TELEPHONE ENCOUNTER
Patient was able to get into [facility] in [place] sooner than at [facility] and patient would like an order/referral with records sent to their office please.

## 2024-01-12 ENCOUNTER — CLINICAL DOCUMENTATION (OUTPATIENT)
Dept: ORTHOPEDIC SURGERY | Age: 59
End: 2024-01-12

## 2024-01-12 DIAGNOSIS — G89.18 POST-OP PAIN: Primary | ICD-10-CM

## 2024-01-12 RX ORDER — TRAMADOL HYDROCHLORIDE 50 MG/1
50 TABLET ORAL EVERY 4 HOURS PRN
Qty: 30 TABLET | Refills: 0 | Status: SHIPPED | OUTPATIENT
Start: 2024-01-12 | End: 2024-01-17

## 2024-01-24 ENCOUNTER — OFFICE VISIT (OUTPATIENT)
Dept: INTERNAL MEDICINE CLINIC | Facility: CLINIC | Age: 59
End: 2024-01-24
Payer: COMMERCIAL

## 2024-01-24 VITALS
OXYGEN SATURATION: 98 % | BODY MASS INDEX: 27.64 KG/M2 | WEIGHT: 172 LBS | HEIGHT: 66 IN | SYSTOLIC BLOOD PRESSURE: 112 MMHG | TEMPERATURE: 97.8 F | HEART RATE: 77 BPM | DIASTOLIC BLOOD PRESSURE: 76 MMHG

## 2024-01-24 DIAGNOSIS — G25.81 RESTLESS LEG SYNDROME: ICD-10-CM

## 2024-01-24 PROCEDURE — 99213 OFFICE O/P EST LOW 20 MIN: CPT | Performed by: INTERNAL MEDICINE

## 2024-01-24 RX ORDER — GABAPENTIN 300 MG/1
CAPSULE ORAL
Qty: 180 CAPSULE | Refills: 5 | Status: SHIPPED | OUTPATIENT
Start: 2024-01-24 | End: 2024-07-26

## 2024-01-24 RX ORDER — PHENDIMETRAZINE TARTRATE 35 MG/1
TABLET ORAL 4 TIMES DAILY
COMMUNITY
Start: 2024-01-11

## 2024-01-24 ASSESSMENT — PATIENT HEALTH QUESTIONNAIRE - PHQ9
SUM OF ALL RESPONSES TO PHQ9 QUESTIONS 1 & 2: 0
SUM OF ALL RESPONSES TO PHQ QUESTIONS 1-9: 0
8. MOVING OR SPEAKING SO SLOWLY THAT OTHER PEOPLE COULD HAVE NOTICED. OR THE OPPOSITE, BEING SO FIGETY OR RESTLESS THAT YOU HAVE BEEN MOVING AROUND A LOT MORE THAN USUAL: 0
5. POOR APPETITE OR OVEREATING: 0
1. LITTLE INTEREST OR PLEASURE IN DOING THINGS: 0
SUM OF ALL RESPONSES TO PHQ QUESTIONS 1-9: 0
9. THOUGHTS THAT YOU WOULD BE BETTER OFF DEAD, OR OF HURTING YOURSELF: 0
3. TROUBLE FALLING OR STAYING ASLEEP: 0
4. FEELING TIRED OR HAVING LITTLE ENERGY: 0
10. IF YOU CHECKED OFF ANY PROBLEMS, HOW DIFFICULT HAVE THESE PROBLEMS MADE IT FOR YOU TO DO YOUR WORK, TAKE CARE OF THINGS AT HOME, OR GET ALONG WITH OTHER PEOPLE: 0
SUM OF ALL RESPONSES TO PHQ QUESTIONS 1-9: 0
7. TROUBLE CONCENTRATING ON THINGS, SUCH AS READING THE NEWSPAPER OR WATCHING TELEVISION: 0
SUM OF ALL RESPONSES TO PHQ QUESTIONS 1-9: 0
2. FEELING DOWN, DEPRESSED OR HOPELESS: 0
6. FEELING BAD ABOUT YOURSELF - OR THAT YOU ARE A FAILURE OR HAVE LET YOURSELF OR YOUR FAMILY DOWN: 0

## 2024-01-24 NOTE — PROGRESS NOTES
35 MG TABS, Take by mouth 4 times daily., Disp: , Rfl:     gabapentin (NEURONTIN) 300 MG capsule, TAKE 2 CAPSULE BY MOUTH THREE TIMES DAILY FOR RESTLESS LEG SYNDROME OR PAIN, Disp: 180 capsule, Rfl: 5    rOPINIRole (REQUIP) 1 MG tablet, TAKE 1 TABLET BY MOUTH AT BEDTIME FOR RESTLESS LEG SYNDROME, Disp: 90 tablet, Rfl: 1    traZODone (DESYREL) 100 MG tablet, Take 1 tablet by mouth nightly, Disp: 90 tablet, Rfl: 1    propranolol (INDERAL) 10 MG tablet, TAKE 1 TABLET BY MOUTH THREE TIMES DAILY FOR ANXIETY, Disp: 90 tablet, Rfl: 5    estradiol 0.025 MG/24HR PTTW, APPLY 1 PATCH EXTERNALLY TO THE SKIN 2 TIMES A WEEK, Disp: 8 patch, Rfl: 11    venlafaxine (EFFEXOR XR) 150 MG extended release capsule, Take 1 capsule by mouth daily, Disp: 90 capsule, Rfl: 3    ibuprofen (ADVIL;MOTRIN) 200 MG tablet, Take 4 tablets by mouth 2 times daily, Disp: , Rfl:     traZODone (DESYREL) 50 MG tablet, , Disp: , Rfl:     Allergies   Allergen Reactions    Dye [Barium-Containing Compounds] Hives     IVP DYE         Review of Systems   Constitutional:  Negative for appetite change, chills, fatigue and fever.   HENT:  Negative for sinus pain.    Respiratory:  Negative for shortness of breath and wheezing.    Cardiovascular:  Negative for chest pain.   Gastrointestinal:  Negative for abdominal pain, constipation, diarrhea, nausea and vomiting.   Genitourinary:  Negative for dysuria and frequency.   Musculoskeletal:  Negative for myalgias.   Neurological:  Negative for dizziness.   Psychiatric/Behavioral:  Negative for suicidal ideas.    All other systems reviewed and are negative.        Vitals:    01/24/24 1504   BP: 112/76   Pulse: 77   Temp: 97.8 °F (36.6 °C)   TempSrc: Temporal   SpO2: 98%   Weight: 78 kg (172 lb)   Height: 1.676 m (5' 6\")       Wt Readings from Last 3 Encounters:   01/24/24 78 kg (172 lb)   12/12/23 84.7 kg (186 lb 12.8 oz)   12/08/23 77.6 kg (171 lb)         Physical Exam  Constitutional:       Appearance: Normal

## 2024-01-25 ENCOUNTER — OFFICE VISIT (OUTPATIENT)
Dept: ORTHOPEDIC SURGERY | Age: 59
End: 2024-01-25

## 2024-01-25 DIAGNOSIS — G89.18 POST-OP PAIN: Primary | ICD-10-CM

## 2024-01-25 DIAGNOSIS — S82.292D OTHER CLOSED FRACTURE OF SHAFT OF LEFT TIBIA WITH ROUTINE HEALING, SUBSEQUENT ENCOUNTER: ICD-10-CM

## 2024-01-25 PROCEDURE — 99024 POSTOP FOLLOW-UP VISIT: CPT | Performed by: ORTHOPAEDIC SURGERY

## 2024-01-25 RX ORDER — TRAZODONE HYDROCHLORIDE 50 MG/1
TABLET ORAL
COMMUNITY
Start: 2023-12-07

## 2024-01-25 NOTE — PROGRESS NOTES
Name: Maggie Carpenter  YOB: 1965  Gender: female  MRN: [id number]    Plan:    Weight-bearing status: WBAT       New PT order given for [facility]    Return to work/work restrictions: Return to work on February 5 with reduced caseload      Follow up in 8 week(s)with XR          Procedure: Surgical Fixation Of Left Tibial Shaft Fracture - Left    Surgery Date: 12/11/2023      Subjective: Denies fevers chills or infection.  Denies any signs of spreading erythema.  Doing well. Denies any significant pain.  Notes a clicking/popping on the lateral aspect of the ankle.    1/25/2024-patient doing well overall.  She was attending formal physical therapy at [facility] but states that she had some bad interactions with the ulnar and has not returned.  She feels that she is doing very well but endorses weakness and low endurance.  She is ready to return to work at this time.    ROS: Patient Denies fever/chills, headache, visual changes, chest pain, shortness of breath, and nausea/vomiting/diarrhea     Exam:     Wounds: appears to be healing well with good reapproximation, healed    Vascular: BLE: 2+ DP pulse, toes wwp w/ BCR<2s    Imaging:   Interpretation of imaging and   XR left tibia 2 view (AP/Lat) for lower leg pain   Findings: Stable fixation of tibial shaft fracture.  Mildly displaced proximal fibula fracture noted.   Impression: Stable surgical fixation of tibial shaft.  Proximal fibula fracture   Signature: Joshua Cuellar MD

## 2024-01-26 ENCOUNTER — TELEMEDICINE (OUTPATIENT)
Dept: BEHAVIORAL/MENTAL HEALTH CLINIC | Age: 59
End: 2024-01-26
Payer: COMMERCIAL

## 2024-01-26 DIAGNOSIS — F43.10 COMPLEX POSTTRAUMATIC STRESS DISORDER: Primary | ICD-10-CM

## 2024-01-26 DIAGNOSIS — F33.42 MDD (MAJOR DEPRESSIVE DISORDER), RECURRENT, IN FULL REMISSION (HCC): ICD-10-CM

## 2024-01-26 PROCEDURE — 99214 OFFICE O/P EST MOD 30 MIN: CPT | Performed by: STUDENT IN AN ORGANIZED HEALTH CARE EDUCATION/TRAINING PROGRAM

## 2024-01-26 ASSESSMENT — PATIENT HEALTH QUESTIONNAIRE - PHQ9
7. TROUBLE CONCENTRATING ON THINGS, SUCH AS READING THE NEWSPAPER OR WATCHING TELEVISION: 0
6. FEELING BAD ABOUT YOURSELF - OR THAT YOU ARE A FAILURE OR HAVE LET YOURSELF OR YOUR FAMILY DOWN: 0
SUM OF ALL RESPONSES TO PHQ QUESTIONS 1-9: 0
1. LITTLE INTEREST OR PLEASURE IN DOING THINGS: 0
4. FEELING TIRED OR HAVING LITTLE ENERGY: 0
9. THOUGHTS THAT YOU WOULD BE BETTER OFF DEAD, OR OF HURTING YOURSELF: 0
SUM OF ALL RESPONSES TO PHQ9 QUESTIONS 1 & 2: 0
3. TROUBLE FALLING OR STAYING ASLEEP: 0
SUM OF ALL RESPONSES TO PHQ QUESTIONS 1-9: 0
10. IF YOU CHECKED OFF ANY PROBLEMS, HOW DIFFICULT HAVE THESE PROBLEMS MADE IT FOR YOU TO DO YOUR WORK, TAKE CARE OF THINGS AT HOME, OR GET ALONG WITH OTHER PEOPLE: 0
2. FEELING DOWN, DEPRESSED OR HOPELESS: 0
SUM OF ALL RESPONSES TO PHQ QUESTIONS 1-9: 0
5. POOR APPETITE OR OVEREATING: 0
8. MOVING OR SPEAKING SO SLOWLY THAT OTHER PEOPLE COULD HAVE NOTICED. OR THE OPPOSITE, BEING SO FIGETY OR RESTLESS THAT YOU HAVE BEEN MOVING AROUND A LOT MORE THAN USUAL: 0
SUM OF ALL RESPONSES TO PHQ QUESTIONS 1-9: 0
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
## 2024-01-29 ENCOUNTER — HOSPITAL ENCOUNTER (OUTPATIENT)
Dept: PHYSICAL THERAPY | Age: 59
Setting detail: RECURRING SERIES
Discharge: HOME | End: 2024-02-01
Attending: ORTHOPAEDIC SURGERY
Payer: COMMERCIAL

## 2024-01-29 DIAGNOSIS — M79.605 PAIN IN LEFT LEG: Primary | ICD-10-CM

## 2024-01-29 DIAGNOSIS — R29.898 WEAKNESS OF LEFT LEG: ICD-10-CM

## 2024-01-29 PROCEDURE — 97110 THERAPEUTIC EXERCISES: CPT

## 2024-01-29 PROCEDURE — 97162 PT EVAL MOD COMPLEX 30 MIN: CPT

## 2024-01-29 NOTE — THERAPY EVALUATION
Maggie Carpenter  : 1965  Primary: [employer] ([employer])  Secondary:  [facility] @ [place] [address]  Phone: [phone]  Fax: [phone] Plan Frequency: 3 times per week until 2023 -> then proceed with 2 times per week for 12 weeks    Plan of Care/Certification Expiration Date: 24        Plan of Care/Certification Expiration Date:  Plan of Care/Certification Expiration Date: 24    Frequency/Duration: Plan Frequency: 3 times per week until 2023 -> then proceed with 2 times per week for 12 weeks      Time In/Out:          PT Visit Info:    Plan Frequency: 3 times per week until 2023 -> then proceed with 2 times per week for 12 weeks      Visit Count:  1                OUTPATIENT PHYSICAL THERAPY:             Initial Assessment 2024               Episode (left leg pain)         Treatment Diagnosis:     Pain in left leg  Weakness of left leg  Medical/Referring Diagnosis:    Post-op pain    Referring Physician:  Joshua Cuellar MD MD Orders:  PT Eval and Treat   Return MD Appt:  3 months from 24  Date of Onset:  Onset Date: 24   (Surgery on )  Allergies:  Dye [barium-containing compounds]  Restrictions/Precautions:    Weight Bearing Status: WBAT      Medications Last Reviewed:  2024     SUBJECTIVE   History of Injury/Illness (Reason for Referral):  Patient is a pleasant 58 year old female who arrives to clinic post-op left surgical fixation of left tibial shaft fracture. Patient notes she twisted her ankle and fell over a branch on 2023. She had surgical fixation of the tibia via a tibial shaft intramedullary nail on 2023. Pt denies complications during or post surgery. Remained in hospital for pain control following surgery till Dec 21st, 2023. Patient notes that she also fractured the superior aspect of the  fibula. Pt notes her current pain is 0/10. She

## 2024-01-29 NOTE — PROGRESS NOTES
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)  Secondary:  [facility] [place]  [place] [address]  Phone: [phone]  Fax: [phone] Plan Frequency: 3 times per week until 2023 -> then proceed with 2 times per week for 12 weeks  Plan of Care/Certification Expiration Date: 24        Plan of Care/Certification Expiration Date:  Plan of Care/Certification Expiration Date: 24    Frequency/Duration: Plan Frequency: 3 times per week until 2023 -> then proceed with 2 times per week for 12 weeks      Time In/Out:   Time In: 845  Time Out: 930      PT Visit Info:    Plan Frequency: 3 times per week until 2023 -> then proceed with 2 times per week for 12 weeks      Visit Count:  1    OUTPATIENT PHYSICAL THERAPY:   Treatment Note 2024       Episode  (left leg pain)               Treatment Diagnosis:    Pain in left leg  Weakness of left leg  Medical/Referring Diagnosis:    Post-op pain    Referring Physician:  Joshua Cuellar MD MD Orders:  PT Eval and Treat   Return MD Appt:  3 months from 24   Date of Onset:  23 injury, 23 tibial fixation surgery  Allergies:   Dye [barium-containing compounds]  Restrictions/Precautions:   None      Interventions Planned (Treatment may consist of any combination of the following):     See Assessment Note    Subjective Comments:   See evaluation note  Initial Pain Level::     00/10  Post Session Pain Level:      0 0/10  Medications Last Reviewed:  2024  Updated Objective Findings:  See Evaluation Note from today    Evaluation 24:  Observation:  Light knee effusion (global)  Light lower leg and ankle effusion (global)  Increased erythema of left lower leg compared to right lower leg  Incision sites - show good closure and no signs of infection  Palpation:   Increased warmth in anterior shin and lateral knee  Range of Motion:   125 right knee flexion  -4 deg knee

## 2024-01-30 ASSESSMENT — PAIN SCALES - GENERAL: PAINLEVEL_OUTOF10: 0

## 2024-01-31 ENCOUNTER — HOSPITAL ENCOUNTER (OUTPATIENT)
Dept: PHYSICAL THERAPY | Age: 59
Setting detail: RECURRING SERIES
Discharge: HOME | End: 2024-02-03
Attending: ORTHOPAEDIC SURGERY
Payer: COMMERCIAL

## 2024-01-31 PROCEDURE — 97016 VASOPNEUMATIC DEVICE THERAPY: CPT

## 2024-01-31 PROCEDURE — 97110 THERAPEUTIC EXERCISES: CPT

## 2024-01-31 NOTE — PROGRESS NOTES
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)  Secondary:  [facility] @ [place]  [place] [address]  Phone: [phone]  Fax: [phone] Plan Frequency: 3 times per week until 2023 -> then proceed with 2 times per week for 12 weeks  Plan of Care/Certification Expiration Date: 24        Plan of Care/Certification Expiration Date:  Plan of Care/Certification Expiration Date: 24    Frequency/Duration: Plan Frequency: 3 times per week until 2023 -> then proceed with 2 times per week for 12 weeks      Time In/Out:   Time In: 1015  Time Out: 1100      PT Visit Info:    Plan Frequency: 3 times per week until 2023 -> then proceed with 2 times per week for 12 weeks      Visit Count:  2    OUTPATIENT PHYSICAL THERAPY:   Treatment Note 2024       Episode  (left leg pain)               Treatment Diagnosis:    Pain in left leg  Weakness of left leg  Medical/Referring Diagnosis:    Post-op pain    Referring Physician:  Joshua Cuellar MD MD Orders:  PT Eval and Treat   Return MD Appt:  3 months from 24   Date of Onset:  23 injury, 23 tibial fixation surgery  Allergies:   Dye [barium-containing compounds]  Restrictions/Precautions:   None      Interventions Planned (Treatment may consist of any combination of the following):     See Assessment Note    Subjective Comments:   Notes no increased pain. HEP going well. Swelling better.   Initial Pain Level::     0 /10  Post Session Pain Level:      0  /10  Medications Last Reviewed:  2024  Updated Objective Findings:      Evaluation 24:  Observation:  Light knee effusion (global)  Light lower leg and ankle effusion (global)  Increased erythema of left lower leg compared to right lower leg  Incision sites - show good closure and no signs of infection  Palpation:   Increased warmth in anterior shin and lateral knee  Range of Motion:   125 right knee flexion  -4

## 2024-02-02 ENCOUNTER — HOSPITAL ENCOUNTER (OUTPATIENT)
Dept: PHYSICAL THERAPY | Age: 59
Setting detail: RECURRING SERIES
Discharge: HOME | End: 2024-02-05
Attending: ORTHOPAEDIC SURGERY
Payer: COMMERCIAL

## 2024-02-02 PROCEDURE — 97016 VASOPNEUMATIC DEVICE THERAPY: CPT

## 2024-02-02 PROCEDURE — 97110 THERAPEUTIC EXERCISES: CPT

## 2024-02-02 NOTE — PROGRESS NOTES
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)  Secondary:  [facility] @ [place]  [place] [address]  Phone: [phone]  Fax: [phone] Plan Frequency: 3 times per week until 2023 -> then proceed with 2 times per week for 12 weeks  Plan of Care/Certification Expiration Date: 24        Plan of Care/Certification Expiration Date:  Plan of Care/Certification Expiration Date: 24    Frequency/Duration: Plan Frequency: 3 times per week until 2023 -> then proceed with 2 times per week for 12 weeks      Time In/Out:   Time In: 1100  Time Out: 1200      PT Visit Info:    Plan Frequency: 3 times per week until 2023 -> then proceed with 2 times per week for 12 weeks      Visit Count:  3    OUTPATIENT PHYSICAL THERAPY:   Treatment Note 2024       Episode  (left leg pain)               Treatment Diagnosis:    Pain in left leg  Weakness of left leg  Medical/Referring Diagnosis:    Post-op pain    Referring Physician:  Joshua Cuellar MD MD Orders:  PT Eval and Treat   Return MD Appt:  3 months from 24   Date of Onset:  23 injury, 23 tibial fixation surgery  Allergies:   Dye [barium-containing compounds]  Restrictions/Precautions:   None      Interventions Planned (Treatment may consist of any combination of the following):     See Assessment Note    Subjective Comments:   Notes no increased pain. HEP going well. Swelling better.   Initial Pain Level::     0 /10  Post Session Pain Level:      0  /10  Medications Last Reviewed:  2024  Updated Objective Findings:      Evaluation 24:  Observation:  Light knee effusion (global)  Light lower leg and ankle effusion (global)  Increased erythema of left lower leg compared to right lower leg  Incision sites - show good closure and no signs of infection  Palpation:   Increased warmth in anterior shin and lateral knee  Range of Motion:   125 right knee flexion  -4

## 2024-02-06 ENCOUNTER — HOSPITAL ENCOUNTER (OUTPATIENT)
Dept: PHYSICAL THERAPY | Age: 59
Setting detail: RECURRING SERIES
End: 2024-02-06
Attending: ORTHOPAEDIC SURGERY
Payer: COMMERCIAL

## 2024-02-07 ENCOUNTER — APPOINTMENT (OUTPATIENT)
Dept: PHYSICAL THERAPY | Age: 59
End: 2024-02-07
Attending: ORTHOPAEDIC SURGERY
Payer: COMMERCIAL

## 2024-02-09 ENCOUNTER — HOSPITAL ENCOUNTER (OUTPATIENT)
Dept: PHYSICAL THERAPY | Age: 59
Setting detail: RECURRING SERIES
Discharge: HOME | End: 2024-02-12
Attending: ORTHOPAEDIC SURGERY
Payer: COMMERCIAL

## 2024-02-09 PROCEDURE — 97110 THERAPEUTIC EXERCISES: CPT

## 2024-02-09 NOTE — PROGRESS NOTES
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)  Secondary:  [facility] @ [place]  [place] [address]  Phone: [phone]  Fax: [phone] Plan Frequency: 3 times per week until 2023 -> then proceed with 2 times per week for 12 weeks  Plan of Care/Certification Expiration Date: 24        Plan of Care/Certification Expiration Date:  Plan of Care/Certification Expiration Date: 24    Frequency/Duration: Plan Frequency: 3 times per week until 2023 -> then proceed with 2 times per week for 12 weeks      Time In/Out:   Time In: 0845  Time Out: 940      PT Visit Info:    Plan Frequency: 3 times per week until 2023 -> then proceed with 2 times per week for 12 weeks      Visit Count:  4    OUTPATIENT PHYSICAL THERAPY:   Treatment Note 2024       Episode  (left leg pain)               Treatment Diagnosis:    Pain in left leg  Weakness of left leg  Medical/Referring Diagnosis:    Post-op pain    Referring Physician:  Joshua Cuellar MD MD Orders:  PT Eval and Treat   Return MD Appt:  3 months from 24   Date of Onset:  23 injury, 23 tibial fixation surgery  Allergies:   Dye [barium-containing compounds]  Restrictions/Precautions:   None      Interventions Planned (Treatment may consist of any combination of the following):     See Assessment Note    Subjective Comments:   Went back to work. 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 Felt fine 1st and 2nd day. 3rd day started hurting and knee swollen like a soft ball and went home early yesterday (3rd day).   Initial Pain Level::     0 /10  Post Session Pain Level:      0  /10  Medications Last Reviewed:  2024  Updated Objective Findings:      Evaluation 24:  Observation:  Light knee effusion (global)  Light lower leg and ankle effusion (global)  Increased erythema of left lower leg compared to right lower leg  Incision sites - show good closure

## 2024-02-12 ENCOUNTER — APPOINTMENT (OUTPATIENT)
Dept: PHYSICAL THERAPY | Age: 59
End: 2024-02-12
Attending: ORTHOPAEDIC SURGERY
Payer: COMMERCIAL

## 2024-02-14 ENCOUNTER — HOSPITAL ENCOUNTER (OUTPATIENT)
Dept: PHYSICAL THERAPY | Age: 59
Setting detail: RECURRING SERIES
Discharge: HOME | End: 2024-02-17
Attending: ORTHOPAEDIC SURGERY
Payer: COMMERCIAL

## 2024-02-14 DIAGNOSIS — G89.18 POST-OP PAIN: Primary | ICD-10-CM

## 2024-02-14 PROCEDURE — 97110 THERAPEUTIC EXERCISES: CPT

## 2024-02-14 RX ORDER — TRAMADOL HYDROCHLORIDE 50 MG/1
50 TABLET ORAL EVERY 4 HOURS PRN
Qty: 30 TABLET | Refills: 0 | Status: SHIPPED | OUTPATIENT
Start: 2024-02-14 | End: 2024-02-19

## 2024-02-14 NOTE — PROGRESS NOTES
2/14/24  Visit 5   Modalities:  15 min 15 min 10 min      Game ready knee and lower leg mod compression max chill  Game ready knee and lower leg mod compression max chill  Ice 10 min knee and shin                    Therapeutic Exercise: 30 min 30 min 45 min 45 min 40 min    Patient educated in pain mgt, icing, compression socks, exercise freq, volume, type HEP:  - optional bike  - SLR and hip abduction 3 x 10  - heel slides, ankle pumps, quad sets, gastroc/soleus stretch HEP:  - optional bike  - SLR and hip abduction 3 x 10  - heel slides, ankle pumps, quad sets, gastroc/soleus stretch Educated patient in self mgt of swelling and pain at work. Educated in use of position change. Educated in physiology of bone healing.  -     Bike 5 min Bike 5 min    Bike 5 min Nu Step  5 min       Low back stretching warm up:    PPT x 10    Knee to chest x 10    Piriformis stretch x 2 x 30 sec    LTR x 20 -    Heel slides x 10 Heel slides:x10    Ankle pumps:x15    Quad set:x10    Gastroc stretch, soleus stretch with strap:2x30 sec ea. Heel slides:x10    Ankle pumps:x15    Quad set:x10    Gastroc stretch, soleus stretch with strap:2x30 sec ea. Heel slides:x10    Ankle pumps:x15    Quad set:x10    Gastroc stretch, soleus stretch with strap:2x30 sec ea.    Hip flexor stretch 2 x 30 sec tamika    Heel slides x 10    Quad set x 10    Gastroc stretch, soleus stretch with strap:2x30 sec ea.    Ankle pumps supine x 10 Isometric hamstring 5 x 10 varying ranges Isometric hamstring 5 x 10 varying ranges  Sit to stand ground 1x10    Foam 2x10    Quad set x 10  Leg press:  2 x 10 x 40#  -    Gastroc stretch strap x 30 sec    Soleus stretch x 30 sec strap    Tandem stance  B 30 sec x2     Bridges: 2 x 10    Sidelying hip abd: 2 x 10 Bridges: 2 x 10    Sidelying hip abd: 2 x 15 Bridges  2 x 10    Sidelying hip abd: 2 x 15    SLR 2 x 10    Standing hip ext 2 x 10  Bridges  2x10    SLR 1# B 2x10    Side lying abduct 1# B 2x10    Clam shells 1# B 2x10

## 2024-02-15 DIAGNOSIS — G25.81 RESTLESS LEG SYNDROME: ICD-10-CM

## 2024-02-16 ENCOUNTER — HOSPITAL ENCOUNTER (OUTPATIENT)
Dept: PHYSICAL THERAPY | Age: 59
Setting detail: RECURRING SERIES
End: 2024-02-16
Attending: ORTHOPAEDIC SURGERY
Payer: COMMERCIAL

## 2024-02-16 RX ORDER — ROPINIROLE 1 MG/1
TABLET, FILM COATED ORAL
Qty: 90 TABLET | Refills: 1 | Status: SHIPPED | OUTPATIENT
Start: 2024-02-16

## 2024-02-16 NOTE — PROGRESS NOTES
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)  Secondary:  [facility] [place] [address] [facility] [address]  Phone: [phone]  Fax: [phone] Plan Frequency: 3 times per week until 2023 -> then proceed with 2 times per week for 12 weeks  Plan of Care/Certification Expiration Date: 24        Plan of Care/Certification Expiration Date:  Plan of Care/Certification Expiration Date: 24    Frequency/Duration: Plan Frequency: 3 times per week until 2023 -> then proceed with 2 times per week for 12 weeks      Time In/Out:          PT Visit Info:    Plan Frequency: 3 times per week until 2023 -> then proceed with 2 times per week for 12 weeks          OUTPATIENT PHYSICAL THERAPY:   Cancellation 2024       Episode  (left leg pain)             Patient cancelled scheduled appointment.

## 2024-02-19 ENCOUNTER — APPOINTMENT (OUTPATIENT)
Dept: PHYSICAL THERAPY | Age: 59
End: 2024-02-19
Attending: ORTHOPAEDIC SURGERY
Payer: COMMERCIAL

## 2024-02-21 ENCOUNTER — HOSPITAL ENCOUNTER (OUTPATIENT)
Dept: PHYSICAL THERAPY | Age: 59
Setting detail: RECURRING SERIES
Discharge: HOME | End: 2024-02-24
Attending: ORTHOPAEDIC SURGERY
Payer: COMMERCIAL

## 2024-02-21 PROCEDURE — 97110 THERAPEUTIC EXERCISES: CPT

## 2024-02-21 PROCEDURE — 97016 VASOPNEUMATIC DEVICE THERAPY: CPT

## 2024-02-21 PROCEDURE — 97140 MANUAL THERAPY 1/> REGIONS: CPT

## 2024-02-21 NOTE — PROGRESS NOTES
Game ready knee and lower leg mod compression max chill  Game ready knee and lower leg mod compression max chill  Ice 10 min knee and shin  Game ready knee and lower leg mod compression max chill                      Therapeutic Exercise: 30 min 30 min 45 min 45 min 40 min 30 min    Patient educated in pain mgt, icing, compression socks, exercise freq, volume, type HEP:  - optional bike  - SLR and hip abduction 3 x 10  - heel slides, ankle pumps, quad sets, gastroc/soleus stretch HEP:  - optional bike  - SLR and hip abduction 3 x 10  - heel slides, ankle pumps, quad sets, gastroc/soleus stretch Educated patient in self mgt of swelling and pain at work. Educated in use of position change. Educated in physiology of bone healing.  - Ergonomics review.  Review of use of cane for offloading leg.      Bike 5 min Bike 5 min    Bike 5 min Nu Step  5 min Bike x 5 min       Low back stretching warm up:    PPT x 10    Knee to chest x 10    Piriformis stretch x 2 x 30 sec    LTR x 20 -     Heel slides x 10 Heel slides:x10    Ankle pumps:x15    Quad set:x10    Gastroc stretch, soleus stretch with strap:2x30 sec ea. Heel slides:x10    Ankle pumps:x15    Quad set:x10    Gastroc stretch, soleus stretch with strap:2x30 sec ea. Heel slides:x10    Ankle pumps:x15    Quad set:x10    Gastroc stretch, soleus stretch with strap:2x30 sec ea.    Hip flexor stretch 2 x 30 sec tamika    Heel slides x 10    Quad set x 10    Gastroc stretch, soleus stretch with strap:2x30 sec ea. Quad set x 20    Heel slides with ankle pump x 10    Gastroc stretch, soleus stretch with strap:3x30 sec ea.      Ankle pumps supine x 10 Isometric hamstring 5 x 10 varying ranges Isometric hamstring 5 x 10 varying ranges  Sit to stand ground 1x10    Foam 2x10     Quad set x 10  Leg press:  2 x 10 x 40#  -     Gastroc stretch strap x 30 sec    Soleus stretch x 30 sec strap    Tandem stance  B 30 sec x2      Bridges: 2 x 10    Sidelying hip abd: 2 x 10 Bridges: 2 x

## 2024-02-23 ENCOUNTER — HOSPITAL ENCOUNTER (OUTPATIENT)
Dept: PHYSICAL THERAPY | Age: 59
Setting detail: RECURRING SERIES
End: 2024-02-23
Attending: ORTHOPAEDIC SURGERY
Payer: COMMERCIAL

## 2024-02-26 ENCOUNTER — HOSPITAL ENCOUNTER (OUTPATIENT)
Dept: PHYSICAL THERAPY | Age: 59
Setting detail: RECURRING SERIES
Discharge: HOME | End: 2024-02-29
Attending: ORTHOPAEDIC SURGERY
Payer: COMMERCIAL

## 2024-02-26 PROCEDURE — 97140 MANUAL THERAPY 1/> REGIONS: CPT

## 2024-02-26 PROCEDURE — 97016 VASOPNEUMATIC DEVICE THERAPY: CPT

## 2024-02-26 PROCEDURE — 97110 THERAPEUTIC EXERCISES: CPT

## 2024-02-26 NOTE — PROGRESS NOTES
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)  Secondary:  [facility] @ [place]  [place] [address]  Phone: [phone]  Fax: [phone] Plan Frequency: 3 times per week until 2023 -> then proceed with 2 times per week for 12 weeks  Plan of Care/Certification Expiration Date: 24        Plan of Care/Certification Expiration Date:  Plan of Care/Certification Expiration Date: 24    Frequency/Duration: Plan Frequency: 3 times per week until 2023 -> then proceed with 2 times per week for 12 weeks      Time In/Out:   Time In: 0800  Time Out: 0900      PT Visit Info:    Plan Frequency: 3 times per week until 2023 -> then proceed with 2 times per week for 12 weeks      Visit Count:  7    OUTPATIENT PHYSICAL THERAPY:   Treatment Note 2024       Episode  (left leg pain)               Treatment Diagnosis:    Pain in left leg  Weakness of left leg    Medical/Referring Diagnosis:    Post-op pain  Post-op pain (G89.18)    Referring Physician:  Joshua Cuellar MD MD Orders:  PT Eval and Treat   Return MD Appt:  3 months from 24   Date of Onset:  23 injury, 23 tibial fixation surgery  Allergies:   Dye [barium-containing compounds]  Restrictions/Precautions:   None      Interventions Planned (Treatment may consist of any combination of the following):     See Assessment Note    Subjective Comments:   Patient reports increased pain in the morning.      Initial Pain Level::     6-7/10 L tibia  Post Session Pain Level:     no increase reported  Medications Last Reviewed:  2024  Updated Objective Findings:   None today    Evaluation 24:  Observation:  Light knee effusion (global)  Light lower leg and ankle effusion (global)  Increased erythema of left lower leg compared to right lower leg  Incision sites - show good closure and no signs of infection  Palpation:   Increased warmth in anterior shin and lateral

## 2024-02-28 ENCOUNTER — APPOINTMENT (OUTPATIENT)
Dept: PHYSICAL THERAPY | Age: 59
End: 2024-02-28
Attending: ORTHOPAEDIC SURGERY
Payer: COMMERCIAL

## 2024-03-01 ENCOUNTER — HOSPITAL ENCOUNTER (OUTPATIENT)
Dept: PHYSICAL THERAPY | Age: 59
Setting detail: RECURRING SERIES
Discharge: HOME | End: 2024-03-04
Attending: ORTHOPAEDIC SURGERY
Payer: COMMERCIAL

## 2024-03-01 PROCEDURE — 97110 THERAPEUTIC EXERCISES: CPT

## 2024-03-01 PROCEDURE — 97016 VASOPNEUMATIC DEVICE THERAPY: CPT

## 2024-03-01 NOTE — PROGRESS NOTES
sets, gastroc/soleus stretch Educated patient in self mgt of swelling and pain at work. Educated in use of position change. Educated in physiology of bone healing.  - Ergonomics review.  Review of use of cane for offloading leg.  Gait training no even surfaces with cane and cues to step through. Then gait training without the AD Bike x5 min     Bike 5 min Bike 5 min    Bike 5 min Nu Step  5 min Bike x 5 min Bike x5 min Lateral band walk x2 laps green CLX       Low back stretching warm up:    PPT x 10    Knee to chest x 10    Piriformis stretch x 2 x 30 sec    LTR x 20 -  Step ups on 4 in, 3x10 ea  Forward and lateral step ups Heel raises x30     Incline stretch x1 min hold on level 1    Heel slides x 10 Heel slides:x10    Ankle pumps:x15    Quad set:x10    Gastroc stretch, soleus stretch with strap:2x30 sec ea. Heel slides:x10    Ankle pumps:x15    Quad set:x10    Gastroc stretch, soleus stretch with strap:2x30 sec ea. Heel slides:x10    Ankle pumps:x15    Quad set:x10    Gastroc stretch, soleus stretch with strap:2x30 sec ea.    Hip flexor stretch 2 x 30 sec tamika    Heel slides x 10    Quad set x 10    Gastroc stretch, soleus stretch with strap:2x30 sec ea. Quad set x 20    Heel slides with ankle pump x 10    Gastroc stretch, soleus stretch with strap:3x30 sec ea.   Heel raises 3x10 standing Knee hugs x2 laps    Ankle pumps supine x 10 Isometric hamstring 5 x 10 varying ranges Isometric hamstring 5 x 10 varying ranges  Sit to stand ground 1x10    Foam 2x10  Incline stretch 1 min hold x2 SLR 3x10    Quad set x 10  Leg press:  2 x 10 x 40#  -   Bridges 3x10    Gastroc stretch strap x 30 sec    Soleus stretch x 30 sec strap    Tandem stance  B 30 sec x2   Stretching into DF and PF 30 sec hold x3 each     Bridges: 2 x 10    Sidelying hip abd: 2 x 10 Bridges: 2 x 10    Sidelying hip abd: 2 x 15 Bridges  2 x 10    Sidelying hip abd: 2 x 15    SLR 2 x 10    Standing hip ext 2 x 10  Bridges  2x10    SLR 1# B 2x10    Side

## 2024-03-05 ENCOUNTER — HOSPITAL ENCOUNTER (OUTPATIENT)
Dept: PHYSICAL THERAPY | Age: 59
Setting detail: RECURRING SERIES
Discharge: HOME | End: 2024-03-08
Attending: ORTHOPAEDIC SURGERY
Payer: COMMERCIAL

## 2024-03-05 PROCEDURE — 97016 VASOPNEUMATIC DEVICE THERAPY: CPT

## 2024-03-05 PROCEDURE — 97110 THERAPEUTIC EXERCISES: CPT

## 2024-03-05 NOTE — PROGRESS NOTES
gastroc/soleus stretch Educated patient in self mgt of swelling and pain at work. Educated in use of position change. Educated in physiology of bone healing.  - Ergonomics review.  Review of use of cane for offloading leg.  Gait training no even surfaces with cane and cues to step through. Then gait training without the AD Bike x5 min Bike x5 min     Bike 5 min Bike 5 min    Bike 5 min Nu Step  5 min Bike x 5 min Bike x5 min Lateral band walk x2 laps green CLX Lateral band walk x 2 laps green       Low back stretching warm up:    PPT x 10    Knee to chest x 10    Piriformis stretch x 2 x 30 sec    LTR x 20 -  Step ups on 4 in, 3x10 ea  Forward and lateral step ups Heel raises x30     Incline stretch x1 min hold on level 1 Heel raises x30    Incline stretch x 1 min hold    Heel slides x 10 Heel slides:x10    Ankle pumps:x15    Quad set:x10    Gastroc stretch, soleus stretch with strap:2x30 sec ea. Heel slides:x10    Ankle pumps:x15    Quad set:x10    Gastroc stretch, soleus stretch with strap:2x30 sec ea. Heel slides:x10    Ankle pumps:x15    Quad set:x10    Gastroc stretch, soleus stretch with strap:2x30 sec ea.    Hip flexor stretch 2 x 30 sec tamika    Heel slides x 10    Quad set x 10    Gastroc stretch, soleus stretch with strap:2x30 sec ea. Quad set x 20    Heel slides with ankle pump x 10    Gastroc stretch, soleus stretch with strap:3x30 sec ea.   Heel raises 3x10 standing Knee hugs x2 laps Knee hugs x 2 laps    Single leg stance on foam 30 sec hold x 3 no UE support    Single leg RDL 2x10, 7# with one hand one bar    Ankle pumps supine x 10 Isometric hamstring 5 x 10 varying ranges Isometric hamstring 5 x 10 varying ranges  Sit to stand ground 1x10    Foam 2x10  Incline stretch 1 min hold x2 SLR 3x10 Gastroc stretch 30 sec hold x 2 and heel slides x20    Quad set x 10  Leg press:  2 x 10 x 40#  -   Bridges 3x10     Gastroc stretch strap x 30 sec    Soleus stretch x 30 sec strap    Tandem stance  B 30 sec x2

## 2024-03-08 ENCOUNTER — HOSPITAL ENCOUNTER (OUTPATIENT)
Dept: PHYSICAL THERAPY | Age: 59
Setting detail: RECURRING SERIES
Discharge: HOME | End: 2024-03-11
Attending: ORTHOPAEDIC SURGERY
Payer: COMMERCIAL

## 2024-03-12 ENCOUNTER — HOSPITAL ENCOUNTER (OUTPATIENT)
Dept: PHYSICAL THERAPY | Age: 59
Setting detail: RECURRING SERIES
Discharge: HOME | End: 2024-03-15
Attending: ORTHOPAEDIC SURGERY
Payer: COMMERCIAL

## 2024-03-12 PROCEDURE — 97110 THERAPEUTIC EXERCISES: CPT

## 2024-03-12 PROCEDURE — 97140 MANUAL THERAPY 1/> REGIONS: CPT

## 2024-03-12 PROCEDURE — 97016 VASOPNEUMATIC DEVICE THERAPY: CPT

## 2024-03-12 NOTE — PROGRESS NOTES
Maggie Carpenter  : 1965  Primary: [employer]  Secondary:  [facility]  [address] [place] [address]  [phone]   [phone]    DISCHARGE SUMMARY    Maggie Carpenter has been seen in physical therapy from 24 to 3/12/24 for 10 visits.   Treatment has been discontinued at this time due to  patient requesting to be discharged .  The below goals were met prior to discontinuation.   Some goals were not met due to discharge.   Thank you for this referral.        Update: 3/1/24  L knee AROM 1-0-120 °  L ankle DF 10 ° past neutral  L ankle PF: 40 °        Goals: (Goals have been discussed and agreed upon with patient.)  Short-Term Functional Goals: Time Frame: 6 weeks  Patient will demonstrate HEP independently. (MET)  Patient will report pain levels with walking as less than 5/10. (MET)  Patient will achieve full knee and ankle ROM compared to the right leg. (Not met at time of discharge)  Discharge Goals: Time Frame: 12 weeks   Patient will report 0/10 pain with standing activities. (MET)  Patient will report 0/10 pain with walking for longer than 60 minutes. (NOT MET)  Patient be able to perform work related activities with 0/10 pain.  (NOT MET)  Patient will demonstrate hep with d/c progressions independently. (NOT MET)       Outcome Measure:   Tool Used: Lower Extremity Functional Scale (LEFS)  Score:  Initial: 44/80 Most Recent: 65/80 (Date: 3-1-24 )   Interpretation of Score: 20 questions each scored on a 5 point scale with 0 representing \"extreme difficulty or unable to perform\" and 4 representing \"no difficulty\".  The lower the score, the greater the functional disability. 80/80 represents no disability.  Minimal detectable change is 9 points.    Regarding Maggie Carpenter's therapy, I certify that the treatment plan above will be carried out by a therapist or under their direction.  Thank you for this referral,  Breanne Hurst PTA     Referring 
HEP  Recommendations/Intent for next treatment session: Next visit will focus on LE strength and balance.     >Total Treatment Billable Duration:  60 minutes   Time In: 0800  Time Out: 0900    Breanne Hurst PTA         Charge Capture  [employer] [facility]  Appt Desk     Future Appointments   Date Time Provider Department Center   3/15/2024  8:00 AM Breanne Hurst, PTA [facility]   3/15/2024 10:00 AM Emeli Palomo LISW [facility]   3/19/2024  8:00 AM Harley Seay, PT [facility]   3/22/2024  8:00 AM Breanne Hurst, PTA [facility]   3/26/2024  8:00 AM Harley Seay, PT [facility]   3/29/2024  8:00 AM Breanne Hurst, PTA [facility]   3/29/2024 10:00 AM Joshua Cuellar MD [facility]   4/26/2024  1:30 PM Rafael Rios MD [facility]   7/26/2024 11:30 AM Maged Carrera, DO [facility]

## 2024-03-15 ENCOUNTER — TELEMEDICINE (OUTPATIENT)
Dept: BEHAVIORAL/MENTAL HEALTH CLINIC | Facility: CLINIC | Age: 59
End: 2024-03-15

## 2024-03-15 ENCOUNTER — APPOINTMENT (OUTPATIENT)
Dept: PHYSICAL THERAPY | Age: 59
End: 2024-03-15
Attending: ORTHOPAEDIC SURGERY
Payer: COMMERCIAL

## 2024-03-15 DIAGNOSIS — F43.10 COMPLEX POSTTRAUMATIC STRESS DISORDER: Primary | ICD-10-CM

## 2024-03-15 NOTE — PROGRESS NOTES
Trauma    [x]Significant Losses  []Adulthood Trauma     Attitude Towards Treatment: Cooperative    Interpretive Summary: Pt is a 58 y/o female referred for therapy by her PCP, Maged Carrera DO,  due to anxiety and depression.      Intervention Used: CBT may be utilized to address the following goals:     Pt will decrease her symptoms of depression and anxiety by recognizing cognitive distortions and positively reframing them as evidenced by self-report and lower PHQ and SMITH scores.    Estimated Length of Treatment: 6 to 12 months    Follow-Up: Return in about 3 weeks (around 4/5/2024).     MADISON Donald

## 2024-03-19 ENCOUNTER — APPOINTMENT (OUTPATIENT)
Dept: PHYSICAL THERAPY | Age: 59
End: 2024-03-19
Attending: ORTHOPAEDIC SURGERY
Payer: COMMERCIAL

## 2024-03-20 DIAGNOSIS — Z79.890 HORMONE REPLACEMENT THERAPY: ICD-10-CM

## 2024-03-20 RX ORDER — ESTRADIOL 0.03 MG/D
FILM, EXTENDED RELEASE TRANSDERMAL
Qty: 24 PATCH | Refills: 4 | Status: SHIPPED | OUTPATIENT
Start: 2024-03-20

## 2024-03-22 ENCOUNTER — APPOINTMENT (OUTPATIENT)
Dept: PHYSICAL THERAPY | Age: 59
End: 2024-03-22
Attending: ORTHOPAEDIC SURGERY
Payer: COMMERCIAL

## 2024-03-26 ENCOUNTER — APPOINTMENT (OUTPATIENT)
Dept: PHYSICAL THERAPY | Age: 59
End: 2024-03-26
Attending: ORTHOPAEDIC SURGERY
Payer: COMMERCIAL

## 2024-03-29 ENCOUNTER — OFFICE VISIT (OUTPATIENT)
Dept: ORTHOPEDIC SURGERY | Age: 59
End: 2024-03-29
Payer: COMMERCIAL

## 2024-03-29 ENCOUNTER — APPOINTMENT (OUTPATIENT)
Dept: PHYSICAL THERAPY | Age: 59
End: 2024-03-29
Attending: ORTHOPAEDIC SURGERY
Payer: COMMERCIAL

## 2024-03-29 DIAGNOSIS — S82.292D OTHER CLOSED FRACTURE OF SHAFT OF LEFT TIBIA WITH ROUTINE HEALING, SUBSEQUENT ENCOUNTER: Primary | ICD-10-CM

## 2024-03-29 PROCEDURE — 99213 OFFICE O/P EST LOW 20 MIN: CPT | Performed by: ORTHOPAEDIC SURGERY

## 2024-03-29 NOTE — PROGRESS NOTES
Name: Maggie Carpenter  YOB: 1965  Gender: female  MRN: [id number]    Plan:    Continue to increase activities as tolerated    Follow up in 3 month (s)with XR -full release at that time         Procedure: Surgical Fixation Of Left Tibial Shaft Fracture - Left    Surgery Date: 12/11/2023      Subjective: Denies fevers chills or infection.  Denies any signs of spreading erythema.  Doing well. Denies any significant pain.  Notes a clicking/popping on the lateral aspect of the ankle.    1/25/2024-patient doing well overall.  She was attending formal physical therapy at [facility] but states that she had some bad interactions with the ulnar and has not returned.  She feels that she is doing very well but endorses weakness and low endurance.  She is ready to return to work at this time.    3/29/2024-patient is doing very well.  She denies any pain.  She is back to doing most of her regular activities.  She is working on building up her endurance.  She is having some hyperextension of her knee.  However she describes no pain.    ROS: Patient Denies fever/chills, headache, visual changes, chest pain, shortness of breath, and nausea/vomiting/diarrhea     Exam:     Wounds: appears to be healing well with good reapproximation, healed    Vascular: BLE: 2+ DP pulse, toes wwp w/ BCR<2s    I examined her left knee.  Lachman exam normal open Loenard exam normal.  She is able to do a single-leg heel rise and a knee squat without signs of the instability.  Her left knee on varus valgus stability and Lachman is symmetric to her contralateral knee.    Imaging:   Interpretation of imaging and   XR left tibia 2 view (AP/Lat) for lower leg pain   Findings: Stable fixation of tibial shaft fracture.  Mildly displaced proximal fibula fracture noted.   Impression: Stable surgical fixation of tibial shaft.  Proximal fibula fracture   Signature: Joshua Cuellar MD

## 2024-04-12 ENCOUNTER — TELEMEDICINE (OUTPATIENT)
Dept: BEHAVIORAL/MENTAL HEALTH CLINIC | Facility: CLINIC | Age: 59
End: 2024-04-12
Payer: COMMERCIAL

## 2024-04-12 DIAGNOSIS — F43.10 COMPLEX POSTTRAUMATIC STRESS DISORDER: Primary | ICD-10-CM

## 2024-04-12 PROCEDURE — 90834 PSYTX W PT 45 MINUTES: CPT | Performed by: COUNSELOR

## 2024-04-12 NOTE — PROGRESS NOTES
[facility]  [address] [place] [address] [address]  [place] [address]    INDIVIDUAL THERAPY NOTE    NAME: Maggie Carpenter    DATE: 4/12/2024    TYPE OF SERVICE: Individual Therapy    LOCATION OF SERVICE: Virtual    TOTAL TIME: 45 minutes    CONSENT: Maggie Carpenter, who was seen by synchronous (real-time) audio-video technology, and/or her healthcare decision maker, is aware that this patient-initiated, Telehealth encounter on 4/12/2024 is a billable service, with coverage as determined by her insurance carrier. She is aware that she may receive a bill and has provided verbal consent to proceed: Yes    Maggie Carpenter, was evaluated through a synchronous (real-time) audio-video encounter. The patient (or guardian if applicable) is aware that this is a billable service, which includes applicable co-pays. This Virtual Visit was conducted with patient's (and/or legal guardian's) consent. Patient identification was verified, and a caregiver was present when appropriate.     The patient was located at Home: [address] [place], [place] [id number]    Provider was located at Facility (Appt Dept): [address] [place] [phone]    Confirm you are appropriately licensed, registered, or certified to deliver care in the state where the patient is located as indicated above. If you are not or unsure, please re-schedule the visit: Yes, I confirm.      DIAGNOSIS:    1. Complex posttraumatic stress disorder      MENTAL STATUS EXAM:  Pt appears well nourished,  Pt was appropriately dressed and groomed.  Attention span was age appropriate. Insight and judgment were age appropriate.  Speech pattern was even.  No bizarre or psychotic behaviors were observed. Motor coordination was good. Pt.s eye content was good and manner of relating appeared developmentally within normal range.    MOOD AND AFFECT: Euthymic with congruent affect    THOUGHT PROCESS: Goal-directed and logical    PLAN: CBT may be used to

## 2024-04-26 ENCOUNTER — TELEMEDICINE (OUTPATIENT)
Dept: BEHAVIORAL/MENTAL HEALTH CLINIC | Age: 59
End: 2024-04-26
Payer: COMMERCIAL

## 2024-04-26 DIAGNOSIS — F43.10 COMPLEX POSTTRAUMATIC STRESS DISORDER: Primary | ICD-10-CM

## 2024-04-26 DIAGNOSIS — F33.42 MDD (MAJOR DEPRESSIVE DISORDER), RECURRENT, IN FULL REMISSION (HCC): ICD-10-CM

## 2024-04-26 PROCEDURE — 99214 OFFICE O/P EST MOD 30 MIN: CPT | Performed by: STUDENT IN AN ORGANIZED HEALTH CARE EDUCATION/TRAINING PROGRAM

## 2024-04-26 NOTE — PROGRESS NOTES
psychiatric history significant for depression, anxiety, childhood trauma who presents for medication management. They are currently prescribed: Effexor  mg daily, Gabapentin 600 mg TID, Trazodone 100 mg QHS.    Pt reports that she has continued to do well and denies significant symptoms of depression, anxiety, SI/HI, A/VH, paranoia or delusions. She recently established with Emeli Palomo for therapy. Reports compliance and denies significant SE. Due to overall stability, will maintain regimen and f/u in 6 mo.       Diagnosis:   Complex PTSD  MDD, recurrent, in full remission   Unspecified anxiety disorder       Plan:    Maggie Carpenter will receive medication management at [facility].    Medication Recommendations:    - Continue current medications as prescribed by PCP    - Medication side effect profiles, risks, and benefits were discussed with the patient.    - Patient encouraged to contact the clinic if experiencing any adverse reactions with medications.    - I have checked the [place] PDMP website prior to prescribing a controlled substance.       Other Recommendations:    - recently established with Emeli Palomo for therapy    - If patient has any concerns for their own safety due to adverse reactions to medications, suicidal or homicidal ideations, auditory or visual hallucinations, or delusions, they have been told to call 911 or go to the nearest emergency department.      RTC: 6 mo      Rafael Rios MD    4/26/2024 1:45 PM    [facility]

## 2024-06-25 ENCOUNTER — OFFICE VISIT (OUTPATIENT)
Dept: INTERNAL MEDICINE CLINIC | Facility: CLINIC | Age: 59
End: 2024-06-25
Payer: COMMERCIAL

## 2024-06-25 VITALS
OXYGEN SATURATION: 98 % | SYSTOLIC BLOOD PRESSURE: 112 MMHG | DIASTOLIC BLOOD PRESSURE: 78 MMHG | WEIGHT: 184 LBS | BODY MASS INDEX: 29.57 KG/M2 | HEIGHT: 66 IN | TEMPERATURE: 98.1 F | HEART RATE: 71 BPM

## 2024-06-25 DIAGNOSIS — Z12.31 ENCOUNTER FOR SCREENING MAMMOGRAM FOR MALIGNANT NEOPLASM OF BREAST: Primary | ICD-10-CM

## 2024-06-25 DIAGNOSIS — G25.81 RESTLESS LEG SYNDROME: ICD-10-CM

## 2024-06-25 DIAGNOSIS — F41.1 GENERALIZED ANXIETY DISORDER WITH PANIC ATTACKS: ICD-10-CM

## 2024-06-25 DIAGNOSIS — Z00.00 ROUTINE ADULT HEALTH MAINTENANCE: ICD-10-CM

## 2024-06-25 DIAGNOSIS — F32.2 SEVERE DEPRESSION (HCC): ICD-10-CM

## 2024-06-25 DIAGNOSIS — F41.0 GENERALIZED ANXIETY DISORDER WITH PANIC ATTACKS: ICD-10-CM

## 2024-06-25 PROCEDURE — 99396 PREV VISIT EST AGE 40-64: CPT | Performed by: INTERNAL MEDICINE

## 2024-06-25 RX ORDER — VENLAFAXINE HYDROCHLORIDE 150 MG/1
150 CAPSULE, EXTENDED RELEASE ORAL DAILY
Qty: 90 CAPSULE | Refills: 3 | Status: SHIPPED | OUTPATIENT
Start: 2024-06-25 | End: 2024-06-25 | Stop reason: SDUPTHER

## 2024-06-25 RX ORDER — PROPRANOLOL HYDROCHLORIDE 10 MG/1
TABLET ORAL
Qty: 90 TABLET | Refills: 5 | Status: SHIPPED | OUTPATIENT
Start: 2024-06-25 | End: 2024-06-25

## 2024-06-25 RX ORDER — GABAPENTIN 300 MG/1
CAPSULE ORAL
Qty: 180 CAPSULE | Refills: 5 | Status: SHIPPED | OUTPATIENT
Start: 2024-06-25 | End: 2024-06-25

## 2024-06-25 RX ORDER — GABAPENTIN 300 MG/1
CAPSULE ORAL
Qty: 180 CAPSULE | Refills: 11 | Status: SHIPPED | OUTPATIENT
Start: 2024-06-25 | End: 2024-12-26

## 2024-06-25 RX ORDER — VENLAFAXINE HYDROCHLORIDE 150 MG/1
150 CAPSULE, EXTENDED RELEASE ORAL DAILY
Qty: 90 CAPSULE | Refills: 3 | Status: SHIPPED | OUTPATIENT
Start: 2024-06-25

## 2024-06-25 RX ORDER — PROPRANOLOL HYDROCHLORIDE 10 MG/1
TABLET ORAL
Qty: 90 TABLET | Refills: 11 | Status: SHIPPED | OUTPATIENT
Start: 2024-06-25

## 2024-06-25 RX ORDER — VENLAFAXINE HYDROCHLORIDE 150 MG/1
150 CAPSULE, EXTENDED RELEASE ORAL DAILY
Qty: 90 CAPSULE | Refills: 3 | Status: SHIPPED | OUTPATIENT
Start: 2024-06-25 | End: 2024-06-25

## 2024-06-25 SDOH — ECONOMIC STABILITY: INCOME INSECURITY: HOW HARD IS IT FOR YOU TO PAY FOR THE VERY BASICS LIKE FOOD, HOUSING, MEDICAL CARE, AND HEATING?: NOT HARD AT ALL

## 2024-06-25 SDOH — ECONOMIC STABILITY: FOOD INSECURITY: WITHIN THE PAST 12 MONTHS, THE FOOD YOU BOUGHT JUST DIDN'T LAST AND YOU DIDN'T HAVE MONEY TO GET MORE.: NEVER TRUE

## 2024-06-25 SDOH — ECONOMIC STABILITY: FOOD INSECURITY: WITHIN THE PAST 12 MONTHS, YOU WORRIED THAT YOUR FOOD WOULD RUN OUT BEFORE YOU GOT MONEY TO BUY MORE.: NEVER TRUE

## 2024-06-25 NOTE — PROGRESS NOTES
Maggie was seen today for medication refill.    Diagnoses and all orders for this visit:    Encounter for screening mammogram for malignant neoplasm of breast  -     CASIE DIGITAL SCREEN W OR WO CAD BILATERAL; Future  -     Hemoglobin A1C; Future  -     Comprehensive Metabolic Panel; Future  -     CBC; Future  -     Lipid Panel; Future  -     TSH with Reflex; Future    Severe depression (HCC)  -     Discontinue: venlafaxine (EFFEXOR XR) 150 MG extended release capsule; Take 1 capsule by mouth daily  -     Discontinue: venlafaxine (EFFEXOR XR) 150 MG extended release capsule; Take 1 capsule by mouth daily  -     venlafaxine (EFFEXOR XR) 150 MG extended release capsule; Take 1 capsule by mouth daily    Restless leg syndrome  -     Discontinue: gabapentin (NEURONTIN) 300 MG capsule; TAKE 2 CAPSULE BY MOUTH THREE TIMES DAILY FOR RESTLESS LEG SYNDROME OR PAIN  -     gabapentin (NEURONTIN) 300 MG capsule; TAKE 2 CAPSULE BY MOUTH THREE TIMES DAILY FOR RESTLESS LEG SYNDROME OR PAIN    Generalized anxiety disorder with panic attacks  -     Discontinue: propranolol (INDERAL) 10 MG tablet; TAKE 1 TABLET BY MOUTH THREE TIMES DAILY FOR ANXIETY  -     propranolol (INDERAL) 10 MG tablet; TAKE 1 TABLET BY MOUTH THREE TIMES DAILY FOR ANXIETY  -     Hemoglobin A1C; Future  -     Comprehensive Metabolic Panel; Future  -     CBC; Future  -     Lipid Panel; Future  -     TSH with Reflex; Future    Routine adult health maintenance  -     Hemoglobin A1C; Future  -     Comprehensive Metabolic Panel; Future  -     CBC; Future  -     Lipid Panel; Future  -     TSH with Reflex; Future          Maggie Carpenter is a 59 y.o. female    Chief Complaint   Patient presents with    Medication Refill         Admission on 12/09/2023, Discharged on 12/14/2023   Component Date Value Ref Range Status    Creatinine 12/09/2023 0.80  0.6 - 1.0 MG/DL Final    WBC 12/10/2023 5.7  4.3 - 11.1 K/uL Final    RBC 12/10/2023 4.51  4.05 - 5.2 M/uL Final    Hemoglobin

## 2024-07-31 DIAGNOSIS — G25.81 RESTLESS LEG SYNDROME: ICD-10-CM

## 2024-08-01 RX ORDER — ROPINIROLE 1 MG/1
TABLET, FILM COATED ORAL
Qty: 90 TABLET | Refills: 3 | Status: SHIPPED | OUTPATIENT
Start: 2024-08-01

## 2024-11-18 ENCOUNTER — TELEPHONE (OUTPATIENT)
Dept: INTERNAL MEDICINE CLINIC | Facility: CLINIC | Age: 59
End: 2024-11-18

## 2024-11-18 DIAGNOSIS — F32.2 SEVERE DEPRESSION (HCC): ICD-10-CM

## 2024-11-18 DIAGNOSIS — F41.1 GENERALIZED ANXIETY DISORDER WITH PANIC ATTACKS: ICD-10-CM

## 2024-11-18 DIAGNOSIS — F41.0 GENERALIZED ANXIETY DISORDER WITH PANIC ATTACKS: ICD-10-CM

## 2024-11-18 RX ORDER — TRAZODONE HYDROCHLORIDE 100 MG/1
100 TABLET ORAL NIGHTLY
Qty: 90 TABLET | Refills: 3 | Status: SHIPPED | OUTPATIENT
Start: 2024-11-18

## 2024-11-18 NOTE — TELEPHONE ENCOUNTER
NEEDS A REFILL ON     traZODone (DESYREL) 50 MG table     SEND TO   [facility]/pharmacy #[id number] - [facility]EL[facility], [place] - [address] - P [phone] - F [phone]

## 2025-03-28 DIAGNOSIS — Z79.890 HORMONE REPLACEMENT THERAPY: ICD-10-CM

## 2025-03-31 ENCOUNTER — TELEPHONE (OUTPATIENT)
Dept: INTERNAL MEDICINE CLINIC | Facility: CLINIC | Age: 60
End: 2025-03-31

## 2025-03-31 DIAGNOSIS — Z79.890 HORMONE REPLACEMENT THERAPY: ICD-10-CM

## 2025-03-31 RX ORDER — ESTRADIOL 0.03 MG/D
FILM, EXTENDED RELEASE TRANSDERMAL
Qty: 24 PATCH | Refills: 0 | Status: SHIPPED | OUTPATIENT
Start: 2025-03-31

## 2025-03-31 RX ORDER — ESTRADIOL 0.03 MG/D
FILM, EXTENDED RELEASE TRANSDERMAL
Qty: 24 PATCH | Refills: 4 | OUTPATIENT
Start: 2025-03-31

## 2025-03-31 NOTE — TELEPHONE ENCOUNTER
Patient called and would like to know if she can get an update on her prescription for her estradiol 0.025 MG/24HR PTTW [[id number]]    Order Details  Dose, Route, Frequency: As Directed   Dispense Quantity: 24 patch Refills: 4          Sig: APPLY 1 PATCH EXTERNALLY TO THE SKIN 2 TIMES A WEEK   Being called in for her due to her being completley out of it. Please Advise.

## 2025-06-27 ENCOUNTER — OFFICE VISIT (OUTPATIENT)
Dept: INTERNAL MEDICINE CLINIC | Facility: CLINIC | Age: 60
End: 2025-06-27
Payer: COMMERCIAL

## 2025-06-27 VITALS
DIASTOLIC BLOOD PRESSURE: 60 MMHG | OXYGEN SATURATION: 99 % | BODY MASS INDEX: 22.66 KG/M2 | HEIGHT: 66 IN | SYSTOLIC BLOOD PRESSURE: 100 MMHG | TEMPERATURE: 97.5 F | WEIGHT: 141 LBS | HEART RATE: 55 BPM

## 2025-06-27 DIAGNOSIS — Z00.00 ROUTINE ADULT HEALTH MAINTENANCE: ICD-10-CM

## 2025-06-27 DIAGNOSIS — F32.2 SEVERE DEPRESSION (HCC): ICD-10-CM

## 2025-06-27 DIAGNOSIS — G25.81 RESTLESS LEG SYNDROME: ICD-10-CM

## 2025-06-27 DIAGNOSIS — F41.0 GENERALIZED ANXIETY DISORDER WITH PANIC ATTACKS: ICD-10-CM

## 2025-06-27 DIAGNOSIS — Z79.890 HORMONE REPLACEMENT THERAPY: Primary | ICD-10-CM

## 2025-06-27 DIAGNOSIS — F41.1 GENERALIZED ANXIETY DISORDER WITH PANIC ATTACKS: ICD-10-CM

## 2025-06-27 PROCEDURE — 3017F COLORECTAL CA SCREEN DOC REV: CPT | Performed by: INTERNAL MEDICINE

## 2025-06-27 PROCEDURE — G8427 DOCREV CUR MEDS BY ELIG CLIN: HCPCS | Performed by: INTERNAL MEDICINE

## 2025-06-27 PROCEDURE — G8420 CALC BMI NORM PARAMETERS: HCPCS | Performed by: INTERNAL MEDICINE

## 2025-06-27 PROCEDURE — 1036F TOBACCO NON-USER: CPT | Performed by: INTERNAL MEDICINE

## 2025-06-27 PROCEDURE — 99214 OFFICE O/P EST MOD 30 MIN: CPT | Performed by: INTERNAL MEDICINE

## 2025-06-27 RX ORDER — VENLAFAXINE HYDROCHLORIDE 150 MG/1
150 CAPSULE, EXTENDED RELEASE ORAL DAILY
Qty: 90 CAPSULE | Refills: 3 | Status: SHIPPED | OUTPATIENT
Start: 2025-06-27

## 2025-06-27 RX ORDER — PROPRANOLOL HYDROCHLORIDE 10 MG/1
TABLET ORAL
Qty: 90 TABLET | Refills: 11 | Status: SHIPPED | OUTPATIENT
Start: 2025-06-27

## 2025-06-27 RX ORDER — ESTRADIOL 0.03 MG/D
1 PATCH TRANSDERMAL
Qty: 4 PATCH | Refills: 5 | Status: SHIPPED | OUTPATIENT
Start: 2025-06-27

## 2025-06-27 RX ORDER — GABAPENTIN 300 MG/1
CAPSULE ORAL
Qty: 180 CAPSULE | Refills: 11 | Status: SHIPPED | OUTPATIENT
Start: 2025-06-27 | End: 2025-12-28

## 2025-06-27 SDOH — ECONOMIC STABILITY: FOOD INSECURITY: WITHIN THE PAST 12 MONTHS, THE FOOD YOU BOUGHT JUST DIDN'T LAST AND YOU DIDN'T HAVE MONEY TO GET MORE.: NEVER TRUE

## 2025-06-27 SDOH — ECONOMIC STABILITY: INCOME INSECURITY: IN THE LAST 12 MONTHS, WAS THERE A TIME WHEN YOU WERE NOT ABLE TO PAY THE MORTGAGE OR RENT ON TIME?: NO

## 2025-06-27 SDOH — ECONOMIC STABILITY: FOOD INSECURITY: WITHIN THE PAST 12 MONTHS, YOU WORRIED THAT YOUR FOOD WOULD RUN OUT BEFORE YOU GOT MONEY TO BUY MORE.: NEVER TRUE

## 2025-06-27 SDOH — ECONOMIC STABILITY: TRANSPORTATION INSECURITY
IN THE PAST 12 MONTHS, HAS LACK OF TRANSPORTATION KEPT YOU FROM MEETINGS, WORK, OR FROM GETTING THINGS NEEDED FOR DAILY LIVING?: NO

## 2025-06-27 SDOH — ECONOMIC STABILITY: TRANSPORTATION INSECURITY
IN THE PAST 12 MONTHS, HAS THE LACK OF TRANSPORTATION KEPT YOU FROM MEDICAL APPOINTMENTS OR FROM GETTING MEDICATIONS?: NO

## 2025-06-27 ASSESSMENT — PATIENT HEALTH QUESTIONNAIRE - PHQ9
8. MOVING OR SPEAKING SO SLOWLY THAT OTHER PEOPLE COULD HAVE NOTICED. OR THE OPPOSITE, BEING SO FIGETY OR RESTLESS THAT YOU HAVE BEEN MOVING AROUND A LOT MORE THAN USUAL: NOT AT ALL
10. IF YOU CHECKED OFF ANY PROBLEMS, HOW DIFFICULT HAVE THESE PROBLEMS MADE IT FOR YOU TO DO YOUR WORK, TAKE CARE OF THINGS AT HOME, OR GET ALONG WITH OTHER PEOPLE: SOMEWHAT DIFFICULT
8. MOVING OR SPEAKING SO SLOWLY THAT OTHER PEOPLE COULD HAVE NOTICED. OR THE OPPOSITE - BEING SO FIDGETY OR RESTLESS THAT YOU HAVE BEEN MOVING AROUND A LOT MORE THAN USUAL: NOT AT ALL
6. FEELING BAD ABOUT YOURSELF - OR THAT YOU ARE A FAILURE OR HAVE LET YOURSELF OR YOUR FAMILY DOWN: NOT AT ALL
10. IF YOU CHECKED OFF ANY PROBLEMS, HOW DIFFICULT HAVE THESE PROBLEMS MADE IT FOR YOU TO DO YOUR WORK, TAKE CARE OF THINGS AT HOME, OR GET ALONG WITH OTHER PEOPLE: SOMEWHAT DIFFICULT
4. FEELING TIRED OR HAVING LITTLE ENERGY: SEVERAL DAYS
SUM OF ALL RESPONSES TO PHQ QUESTIONS 1-9: 2
3. TROUBLE FALLING OR STAYING ASLEEP: NOT AT ALL
2. FEELING DOWN, DEPRESSED OR HOPELESS: NOT AT ALL
1. LITTLE INTEREST OR PLEASURE IN DOING THINGS: NOT AT ALL
5. POOR APPETITE OR OVEREATING: NOT AT ALL
1. LITTLE INTEREST OR PLEASURE IN DOING THINGS: NOT AT ALL
6. FEELING BAD ABOUT YOURSELF - OR THAT YOU ARE A FAILURE OR HAVE LET YOURSELF OR YOUR FAMILY DOWN: NOT AT ALL
SUM OF ALL RESPONSES TO PHQ QUESTIONS 1-9: 2
4. FEELING TIRED OR HAVING LITTLE ENERGY: SEVERAL DAYS
SUM OF ALL RESPONSES TO PHQ QUESTIONS 1-9: 2
9. THOUGHTS THAT YOU WOULD BE BETTER OFF DEAD, OR OF HURTING YOURSELF: NOT AT ALL
7. TROUBLE CONCENTRATING ON THINGS, SUCH AS READING THE NEWSPAPER OR WATCHING TELEVISION: SEVERAL DAYS
SUM OF ALL RESPONSES TO PHQ QUESTIONS 1-9: 2
7. TROUBLE CONCENTRATING ON THINGS, SUCH AS READING THE NEWSPAPER OR WATCHING TELEVISION: SEVERAL DAYS
2. FEELING DOWN, DEPRESSED OR HOPELESS: NOT AT ALL
9. THOUGHTS THAT YOU WOULD BE BETTER OFF DEAD, OR OF HURTING YOURSELF: NOT AT ALL
5. POOR APPETITE OR OVEREATING: NOT AT ALL
SUM OF ALL RESPONSES TO PHQ QUESTIONS 1-9: 2
3. TROUBLE FALLING OR STAYING ASLEEP: NOT AT ALL

## 2025-06-27 NOTE — PROGRESS NOTES
DAILY FOR ANXIETY  -     Lipid Panel; Future  -     CBC with Auto Differential; Future  -     TSH reflex to FT4; Future  -     Comprehensive Metabolic Panel; Future  -     Hemoglobin A1C; Future    Severe depression (HCC)  -     venlafaxine (EFFEXOR XR) 150 MG extended release capsule; Take 1 capsule by mouth daily    Routine adult health maintenance  -     Iron; Future  -     Ferritin; Future  -     Lipid Panel; Future  -     Comprehensive Metabolic Panel; Future  -     CBC; Future  -     TSH reflex to FT4; Future    Other orders  -     estradiol (CLIMARA) 0.025 MG/24HR; Place 1 patch onto the skin every 7 days          The patient (or guardian, if applicable) and other individuals in attendance with the patient were advised that Artificial Intelligence will be utilized during this visit to record, process the conversation to generate a clinical note, and support improvement of the AI technology. The patient (or guardian, if applicable) and other individuals in attendance at the appointment consented to the use of AI, including the recording.        An electronic signature was used to authenticate this note.  Maged Carrera DO

## 2025-07-03 ENCOUNTER — LAB (OUTPATIENT)
Dept: INTERNAL MEDICINE CLINIC | Facility: CLINIC | Age: 60
End: 2025-07-03

## 2025-07-03 ENCOUNTER — TELEPHONE (OUTPATIENT)
Dept: INTERNAL MEDICINE CLINIC | Facility: CLINIC | Age: 60
End: 2025-07-03

## 2025-07-03 DIAGNOSIS — F41.0 GENERALIZED ANXIETY DISORDER WITH PANIC ATTACKS: ICD-10-CM

## 2025-07-03 DIAGNOSIS — G25.81 RESTLESS LEG SYNDROME: ICD-10-CM

## 2025-07-03 DIAGNOSIS — Z00.00 ROUTINE ADULT HEALTH MAINTENANCE: ICD-10-CM

## 2025-07-03 DIAGNOSIS — F41.1 GENERALIZED ANXIETY DISORDER WITH PANIC ATTACKS: ICD-10-CM

## 2025-07-03 LAB
ALBUMIN SERPL-MCNC: 3.8 G/DL (ref 3.5–5)
ALBUMIN/GLOB SERPL: 1.2 (ref 1–1.9)
ALP SERPL-CCNC: 82 U/L (ref 35–104)
ALT SERPL-CCNC: 19 U/L (ref 8–45)
ANION GAP SERPL CALC-SCNC: 10 MMOL/L (ref 7–16)
AST SERPL-CCNC: 20 U/L (ref 15–37)
BASOPHILS # BLD: 0.03 K/UL (ref 0–0.2)
BASOPHILS NFR BLD: 0.7 % (ref 0–2)
BILIRUB SERPL-MCNC: 0.3 MG/DL (ref 0–1.2)
BUN SERPL-MCNC: 14 MG/DL (ref 6–23)
CALCIUM SERPL-MCNC: 9.4 MG/DL (ref 8.8–10.2)
CHLORIDE SERPL-SCNC: 99 MMOL/L (ref 98–107)
CHOLEST SERPL-MCNC: 214 MG/DL (ref 0–200)
CO2 SERPL-SCNC: 28 MMOL/L (ref 20–29)
CREAT SERPL-MCNC: 0.85 MG/DL (ref 0.6–1.1)
DIFFERENTIAL METHOD BLD: ABNORMAL
EOSINOPHIL # BLD: 0.1 K/UL (ref 0–0.8)
EOSINOPHIL NFR BLD: 2.5 % (ref 0.5–7.8)
ERYTHROCYTE [DISTWIDTH] IN BLOOD BY AUTOMATED COUNT: 12.7 % (ref 11.9–14.6)
EST. AVERAGE GLUCOSE BLD GHB EST-MCNC: 105 MG/DL
FERRITIN SERPL-MCNC: 62 NG/ML (ref 8–388)
GLOBULIN SER CALC-MCNC: 3.1 G/DL (ref 2.3–3.5)
GLUCOSE SERPL-MCNC: 97 MG/DL (ref 70–99)
HBA1C MFR BLD: 5.3 % (ref 0–5.6)
HCT VFR BLD AUTO: 42.8 % (ref 35.8–46.3)
HDLC SERPL-MCNC: 78 MG/DL (ref 40–60)
HDLC SERPL: 2.7 (ref 0–5)
HGB BLD-MCNC: 13.6 G/DL (ref 11.7–15.4)
IMM GRANULOCYTES # BLD AUTO: 0.01 K/UL (ref 0–0.5)
IMM GRANULOCYTES NFR BLD AUTO: 0.2 % (ref 0–5)
IRON SERPL-MCNC: 63 UG/DL (ref 35–100)
LDLC SERPL CALC-MCNC: 124 MG/DL (ref 0–100)
LYMPHOCYTES # BLD: 1.38 K/UL (ref 0.5–4.6)
LYMPHOCYTES NFR BLD: 34 % (ref 13–44)
MCH RBC QN AUTO: 29.8 PG (ref 26.1–32.9)
MCHC RBC AUTO-ENTMCNC: 31.8 G/DL (ref 31.4–35)
MCV RBC AUTO: 93.9 FL (ref 82–102)
MONOCYTES # BLD: 0.28 K/UL (ref 0.1–1.3)
MONOCYTES NFR BLD: 6.9 % (ref 4–12)
NEUTS SEG # BLD: 2.26 K/UL (ref 1.7–8.2)
NEUTS SEG NFR BLD: 55.7 % (ref 43–78)
NRBC # BLD: 0 K/UL (ref 0–0.2)
PLATELET # BLD AUTO: 282 K/UL (ref 150–450)
PMV BLD AUTO: 11.9 FL (ref 9.4–12.3)
POTASSIUM SERPL-SCNC: 4.2 MMOL/L (ref 3.5–5.1)
PROT SERPL-MCNC: 6.9 G/DL (ref 6.3–8.2)
RBC # BLD AUTO: 4.56 M/UL (ref 4.05–5.2)
SODIUM SERPL-SCNC: 138 MMOL/L (ref 136–145)
T4 FREE SERPL-MCNC: 0.9 NG/DL (ref 0.9–1.7)
TRIGL SERPL-MCNC: 60 MG/DL (ref 0–150)
TSH W FREE THYROID IF ABNORMAL: 4.93 UIU/ML (ref 0.27–4.2)
VLDLC SERPL CALC-MCNC: 12 MG/DL (ref 6–23)
WBC # BLD AUTO: 4.1 K/UL (ref 4.3–11.1)

## 2025-07-07 DIAGNOSIS — G25.81 RESTLESS LEG SYNDROME: Primary | ICD-10-CM

## 2025-07-07 DIAGNOSIS — R79.89 TSH ELEVATION: ICD-10-CM

## 2025-08-17 ENCOUNTER — PATIENT MESSAGE (OUTPATIENT)
Dept: INTERNAL MEDICINE CLINIC | Facility: CLINIC | Age: 60
End: 2025-08-17

## 2025-08-17 DIAGNOSIS — Z79.890 HORMONE REPLACEMENT THERAPY: ICD-10-CM

## 2025-08-18 RX ORDER — ESTRADIOL 0.03 MG/D
FILM, EXTENDED RELEASE TRANSDERMAL
Qty: 24 PATCH | Refills: 3 | Status: SHIPPED | OUTPATIENT
Start: 2025-08-18

## (undated) DEVICE — RELOAD STPL SZ 0 L45MM DIA3.5MM 0DEG STD REG TISS BLU TI

## (undated) DEVICE — TROCAR RMFG Z 3RD SLEEVE 5X100 -- LAWSON OEM ITEM 262365 PK/6

## (undated) DEVICE — SEALER ENDOSCP L37CM NANO COAT BLNT TIP LAP DIV

## (undated) DEVICE — CONTAINER SPEC FRMLN 120ML --

## (undated) DEVICE — 3M™ TEGADERM™ TRANSPARENT FILM DRESSING FRAME STYLE, 1626W, 4 IN X 4-3/4 IN (10 CM X 12 CM), 50/CT 4CT/CASE: Brand: 3M™ TEGADERM™

## (undated) DEVICE — FOOT DR TOLLISON & WOMACK: Brand: MEDLINE INDUSTRIES, INC.

## (undated) DEVICE — TRAY PREP DRY W/ PREM GLV 2 APPL 6 SPNG 2 UNDPD 1 OVERWRAP

## (undated) DEVICE — REM POLYHESIVE ADULT PATIENT RETURN ELECTRODE: Brand: VALLEYLAB

## (undated) DEVICE — NAIL INSERTION SLEEVE, ELASTIC SPI DIAM.8-13: Brand: T2

## (undated) DEVICE — GUIDE WIRE, BALL-TIPPED, STERILE

## (undated) DEVICE — LOCKING SCREW
Type: IMPLANTABLE DEVICE | Site: LEG | Status: NON-FUNCTIONAL
Brand: T2 ALPHA
Removed: 2023-12-11

## (undated) DEVICE — SUTURE SZ 0 27IN 5/8 CIR UR-6  TAPER PT VIOLET ABSRB VICRYL J603H

## (undated) DEVICE — BAG SPEC REM 224ML W4XL6IN DIA10MM 1 HND GYN DISP ENDOPCH

## (undated) DEVICE — SPONGE LAPAROTOMY W18XL18IN WHITE STRUNG RADIOPAQUE STERILE

## (undated) DEVICE — GENERAL LAPAROSCOPY: Brand: MEDLINE INDUSTRIES, INC.

## (undated) DEVICE — BUTTON SWITCH PENCIL BLADE ELECTRODE, HOLSTER: Brand: EDGE

## (undated) DEVICE — Device

## (undated) DEVICE — LAPAROSCOPIC TROCAR SLEEVE/SINGLE USE: Brand: KII® OPTICAL ACCESS SYSTEM

## (undated) DEVICE — DRESSING HYDROFIBER AQUACEL AG ADVANTAGE 3.5X6 IN

## (undated) DEVICE — SOLUTION IRRIG 1000ML 0.9% SOD CHL USP POUR PLAS BTL

## (undated) DEVICE — GLOVE SURG SZ 85 L12IN FNGR ORTHO 126MIL CRM LTX FREE

## (undated) DEVICE — [HIGH FLOW INSUFFLATOR,  DO NOT USE IF PACKAGE IS DAMAGED,  KEEP DRY,  KEEP AWAY FROM SUNLIGHT,  PROTECT FROM HEAT AND RADIOACTIVE SOURCES.]: Brand: PNEUMOSURE

## (undated) DEVICE — SUTURE MCRYL SZ 3-0 L27IN ABSRB UD L19MM PS-2 3/8 CIR PRIM Y427H

## (undated) DEVICE — TROCAR: Brand: KII® OPTICAL ACCESS SYSTEM

## (undated) DEVICE — CUTTER ENDOSCP L340MM LIN ARTC SGL STROKE FIRING ENDOPATH

## (undated) DEVICE — TROCAR: Brand: KII® SLEEVE

## (undated) DEVICE — 2, DISPOSABLE SUCTION/IRRIGATOR WITHOUT DISPOSABLE TIP: Brand: STRYKEFLOW

## (undated) DEVICE — PAD,NON-ADHERENT,3X8,STERILE,LF,1/PK: Brand: MEDLINE

## (undated) DEVICE — NEEDLE HYPO 21GA L1.5IN GRN POLYPR HUB S STL REG BVL STR

## (undated) DEVICE — GARMENT,MEDLINE,DVT,INT,CALF,MED, GEN2: Brand: MEDLINE

## (undated) DEVICE — SUTURE VCRL SZ 0 L27IN ABSRB UD L36MM CT-1 1/2 CIR J260H

## (undated) DEVICE — GLOVE ORTHO 8   MSG9480

## (undated) DEVICE — 3M™ TEGADERM™ TRANSPARENT FILM DRESSING FRAME STYLE, 1624W, 2-3/8 IN X 2-3/4 IN (6 CM X 7 CM), 100/CT 4CT/CASE: Brand: 3M™ TEGADERM™

## (undated) DEVICE — PAD,ABDOMINAL,5"X9",ST,LF,25/BX: Brand: MEDLINE INDUSTRIES, INC.

## (undated) DEVICE — C-ARM: Brand: UNBRANDED

## (undated) DEVICE — TRAY CATH 16F DRN BG LTX -- CONVERT TO ITEM 363158

## (undated) DEVICE — K-WIRE
Type: IMPLANTABLE DEVICE | Site: LEG | Status: NON-FUNCTIONAL
Removed: 2023-12-11

## (undated) DEVICE — REAMER SHAFT, MOD.TRINKLE: Brand: BIXCUT

## (undated) DEVICE — SPONGE GZ W4XL4IN COT 12 PLY TYP VII WVN C FLD DSGN

## (undated) DEVICE — FIXATION K-WIRE SPI, WCH COATED
Type: IMPLANTABLE DEVICE | Site: LEG | Status: NON-FUNCTIONAL
Brand: T2
Removed: 2023-12-11

## (undated) DEVICE — STERILE PVP: Brand: MEDLINE INDUSTRIES, INC.

## (undated) DEVICE — TROCAR: Brand: KII FIOS FIRST ENTRY

## (undated) DEVICE — SOLUTION IV 1000ML 0.9% SOD CHL

## (undated) DEVICE — SUREFIT, DUAL DISPERSIVE ELECTRODE, CONTACT QUALITY MONITOR: Brand: SUREFIT

## (undated) DEVICE — GLOVE ORANGE PI 7 1/2   MSG9075

## (undated) DEVICE — BANDAGE,ELASTIC,ESMARK,STERILE,4"X9',LF: Brand: MEDLINE

## (undated) DEVICE — LOCKING DRILL

## (undated) DEVICE — FREEHAND DRILL

## (undated) DEVICE — 2000CC GUARDIAN II: Brand: GUARDIAN

## (undated) DEVICE — GOWN,SIRUS,NONRNF,SETINSLV,XL,20/CS: Brand: MEDLINE

## (undated) DEVICE — SUTURE VCRL SZ 2-0 L36IN ABSRB UD L36MM CT-1 1/2 CIR J945H

## (undated) DEVICE — CLOTH PRE OP W9XL10.5IN 2% CHG FRAGRANCE RNS FREE READYPREP

## (undated) DEVICE — DRAPE TWL SURG 16X26IN BLU ORB04] ALLCARE INC]

## (undated) DEVICE — GLOVE SURG SZ 8 L12IN FNGR THK79MIL GRN LTX FREE

## (undated) DEVICE — TROCARS: Brand: KII® BLUNT TIP ACCESS SYSTEM

## (undated) DEVICE — KIT,ANTI FOG,W/SPONGE & FLUID,SOFT PACK: Brand: MEDLINE

## (undated) DEVICE — TOTAL 1-LAYER TRAY, LATEX FOLEY, 16FR 10: Brand: MEDLINE